# Patient Record
Sex: FEMALE | Race: BLACK OR AFRICAN AMERICAN | Employment: OTHER | URBAN - METROPOLITAN AREA
[De-identification: names, ages, dates, MRNs, and addresses within clinical notes are randomized per-mention and may not be internally consistent; named-entity substitution may affect disease eponyms.]

---

## 2019-04-19 ENCOUNTER — HOSPITAL ENCOUNTER (EMERGENCY)
Age: 54
Discharge: HOME | End: 2019-04-20
Attending: EMERGENCY MEDICINE
Payer: MEDICARE

## 2019-04-19 DIAGNOSIS — M54.50 MIDLINE LOW BACK PAIN WITHOUT SCIATICA, UNSPECIFIED CHRONICITY: Primary | ICD-10-CM

## 2019-04-19 LAB
ALBUMIN SERPL-MCNC: 3.2 G/DL (ref 3.5–5)
ALBUMIN/GLOB SERPL: 0.7 {RATIO} (ref 1.2–3.5)
ALP SERPL-CCNC: 135 U/L (ref 50–136)
ALT SERPL-CCNC: 24 U/L (ref 12–65)
ANION GAP SERPL CALC-SCNC: 7 MMOL/L (ref 7–16)
AST SERPL-CCNC: 12 U/L (ref 15–37)
BASOPHILS # BLD: 0.1 K/UL (ref 0–0.2)
BASOPHILS NFR BLD: 1 % (ref 0–2)
BILIRUB SERPL-MCNC: 0.9 MG/DL (ref 0.2–1.1)
BUN SERPL-MCNC: 27 MG/DL (ref 6–23)
CALCIUM SERPL-MCNC: 9.5 MG/DL (ref 8.3–10.4)
CHLORIDE SERPL-SCNC: 103 MMOL/L (ref 98–107)
CO2 SERPL-SCNC: 30 MMOL/L (ref 21–32)
CREAT SERPL-MCNC: 1.17 MG/DL (ref 0.6–1)
DIFFERENTIAL METHOD BLD: ABNORMAL
EOSINOPHIL # BLD: 0.3 K/UL (ref 0–0.8)
EOSINOPHIL NFR BLD: 2 % (ref 0.5–7.8)
ERYTHROCYTE [DISTWIDTH] IN BLOOD BY AUTOMATED COUNT: 15.6 % (ref 11.9–14.6)
GLOBULIN SER CALC-MCNC: 4.8 G/DL (ref 2.3–3.5)
GLUCOSE SERPL-MCNC: 259 MG/DL (ref 65–100)
HCT VFR BLD AUTO: 44 % (ref 35.8–46.3)
HGB BLD-MCNC: 13 G/DL (ref 11.7–15.4)
IMM GRANULOCYTES # BLD AUTO: 0.1 K/UL (ref 0–0.5)
IMM GRANULOCYTES NFR BLD AUTO: 0 % (ref 0–5)
LYMPHOCYTES # BLD: 2.8 K/UL (ref 0.5–4.6)
LYMPHOCYTES NFR BLD: 25 % (ref 13–44)
MCH RBC QN AUTO: 27.8 PG (ref 26.1–32.9)
MCHC RBC AUTO-ENTMCNC: 29.5 G/DL (ref 31.4–35)
MCV RBC AUTO: 94 FL (ref 79.6–97.8)
MONOCYTES # BLD: 0.6 K/UL (ref 0.1–1.3)
MONOCYTES NFR BLD: 5 % (ref 4–12)
NEUTS SEG # BLD: 7.4 K/UL (ref 1.7–8.2)
NEUTS SEG NFR BLD: 66 % (ref 43–78)
NRBC # BLD: 0 K/UL (ref 0–0.2)
PLATELET # BLD AUTO: 364 K/UL (ref 150–450)
PMV BLD AUTO: 10.9 FL (ref 9.4–12.3)
POTASSIUM SERPL-SCNC: 4 MMOL/L (ref 3.5–5.1)
PROT SERPL-MCNC: 8 G/DL (ref 6.3–8.2)
RBC # BLD AUTO: 4.68 M/UL (ref 4.05–5.2)
SODIUM SERPL-SCNC: 140 MMOL/L (ref 136–145)
WBC # BLD AUTO: 11.2 K/UL (ref 4.3–11.1)

## 2019-04-19 PROCEDURE — 80053 COMPREHEN METABOLIC PANEL: CPT

## 2019-04-19 PROCEDURE — 81003 URINALYSIS AUTO W/O SCOPE: CPT | Performed by: EMERGENCY MEDICINE

## 2019-04-19 PROCEDURE — 85025 COMPLETE CBC W/AUTO DIFF WBC: CPT

## 2019-04-19 PROCEDURE — 99284 EMERGENCY DEPT VISIT MOD MDM: CPT | Performed by: EMERGENCY MEDICINE

## 2019-04-20 VITALS
OXYGEN SATURATION: 95 % | HEIGHT: 69 IN | HEART RATE: 78 BPM | WEIGHT: 293 LBS | BODY MASS INDEX: 43.4 KG/M2 | TEMPERATURE: 97.8 F | SYSTOLIC BLOOD PRESSURE: 136 MMHG | RESPIRATION RATE: 16 BRPM | DIASTOLIC BLOOD PRESSURE: 61 MMHG

## 2019-04-20 PROCEDURE — 96374 THER/PROPH/DIAG INJ IV PUSH: CPT | Performed by: EMERGENCY MEDICINE

## 2019-04-20 PROCEDURE — 74011250636 HC RX REV CODE- 250/636: Performed by: EMERGENCY MEDICINE

## 2019-04-20 RX ORDER — KETOROLAC TROMETHAMINE 30 MG/ML
15 INJECTION, SOLUTION INTRAMUSCULAR; INTRAVENOUS
Status: COMPLETED | OUTPATIENT
Start: 2019-04-20 | End: 2019-04-20

## 2019-04-20 RX ADMIN — KETOROLAC TROMETHAMINE 15 MG: 30 INJECTION, SOLUTION INTRAMUSCULAR at 00:37

## 2019-04-20 RX ADMIN — SODIUM CHLORIDE 500 ML: 900 INJECTION, SOLUTION INTRAVENOUS at 00:37

## 2019-04-20 NOTE — ED NOTES
I have reviewed discharge instructions with the patient. The patient verbalized understanding. Patient left ED via Discharge Method: ambulatory to Home with family member. Opportunity for questions and clarification provided. Patient given 0 scripts. To continue your aftercare when you leave the hospital, you may receive an automated call from our care team to check in on how you are doing. This is a free service and part of our promise to provide the best care and service to meet your aftercare needs.  If you have questions, or wish to unsubscribe from this service please call [phone]. Thank you for Choosing our [facility] Emergency Department.

## 2019-04-20 NOTE — ED PROVIDER NOTES
History of recurring back pain. No urinary symptoms. No fevers or chills. She had lived in [place], [place] recently relocated here. States that she has narcotic prescriptions, but does not like to use them and defers on options of being given pain medicine in our department, even after requesting pain meds. No new injury. No fevers or chills. No redness or rash. No bowel or bladder function issues. No lower extremity weakness or numbness. Has pain meds but does not take them The history is provided by medical records. Back Pain This is a new problem. The problem has not changed since onset. The problem occurs daily. The pain is present in the lower back and generalized. The quality of the pain is described as aching. The pain does not radiate. Pertinent negatives include no fever, no numbness, no perianal numbness, no bladder incontinence, no paresthesias, no paresis and no weakness. She has tried nothing for the symptoms. Past Medical History:  
Diagnosis Date  Diabetes ([facility] [address] IDDM \  
 Hypertension  Ill-defined condition   
 chronic back problems  Thromboembolus ([facility] [address] DVT Past Surgical History:  
Procedure Laterality Date  HX GI    
 hernia repair  HX GYN    
 csection  HX HEENT    
 recent neck surg with bone repair No family history on file. Social History Socioeconomic History  Marital status:  Spouse name: Not on file  Number of children: Not on file  Years of education: Not on file  Highest education level: Not on file Occupational History  Not on file Social Needs  Financial resource strain: Not on file  Food insecurity:  
  Worry: Not on file Inability: Not on file  Transportation needs:  
  Medical: Not on file Non-medical: Not on file Tobacco Use  Smoking status: Not on file  Smokeless tobacco: Never Used Substance and Sexual Activity  Alcohol use: Never Frequency: Never  Drug use: Never  Sexual activity: Not on file Lifestyle  Physical activity:  
  Days per week: Not on file Minutes per session: Not on file  Stress: Not on file Relationships  Social connections:  
  Talks on phone: Not on file Gets together: Not on file Attends Yazidi service: Not on file Active member of club or organization: Not on file Attends meetings of clubs or organizations: Not on file Relationship status: Not on file  Intimate partner violence:  
  Fear of current or ex partner: Not on file Emotionally abused: Not on file Physically abused: Not on file Forced sexual activity: Not on file Other Topics Concern  Not on file Social History Narrative  Not on file ALLERGIES: Gabapentin; Lyrica [pregabalin]; Niacin; and Percocet [oxycodone-acetaminophen] Review of Systems Constitutional: Negative for chills and fever. Genitourinary: Negative for bladder incontinence. Musculoskeletal: Positive for back pain. Neurological: Negative for weakness, numbness and paresthesias. Psychiatric/Behavioral: Negative. All other systems reviewed and are negative. Vitals:  
 04/19/19 2257 04/19/19 2308 04/19/19 2331 BP: 171/88 (!) 199/99 166/83 Pulse: 85 85 80 Resp: 18 Temp: 97.9 °F (36.6 °C) SpO2: 96% 97% 95% Weight: (!) 167.8 kg (370 lb) Height: 5' 9\" (1.753 m) Physical Exam  
Constitutional: She appears well-developed and well-nourished. No distress. Very pleasant cooperative . Declines pain med offer HENT:  
Head: Atraumatic. Eyes: No scleral icterus. Neck: Neck supple. Cardiovascular: Normal rate. Pulmonary/Chest: Effort normal. No respiratory distress. Abdominal: Soft. She exhibits no distension. There is no tenderness. There is no guarding. Musculoskeletal: She exhibits tenderness. She exhibits no edema or deformity. Sore bilateral para-spinous lower back Neurological: No sensory deficit. She exhibits normal muscle tone. Skin: Skin is warm and dry. She is not diaphoretic. Psychiatric: Her behavior is normal. Thought content normal.  
Nursing note and vitals reviewed. MDM Number of Diagnoses or Management Options Midline low back pain without sciatica, unspecified chronicity:  
Diagnosis management comments: Some worsening on chronic lower back pain No acute deficit. Amount and/or Complexity of Data Reviewed Clinical lab tests: reviewed Decide to obtain previous medical records or to obtain history from someone other than the patient: yes Risk of Complications, Morbidity, and/or Mortality Presenting problems: moderate Diagnostic procedures: low Management options: low Patient Progress Patient progress: stable Procedures Urinalysis: Normal 
Recent Results (from the past 12 hour(s)) CBC WITH AUTOMATED DIFF Collection Time: 04/19/19 11:01 PM  
Result Value Ref Range WBC 11.2 (H) 4.3 - 11.1 K/uL  
 RBC 4.68 4.05 - 5.2 M/uL  
 HGB 13.0 11.7 - 15.4 g/dL HCT 44.0 35.8 - 46.3 % MCV 94.0 79.6 - 97.8 FL  
 MCH 27.8 26.1 - 32.9 PG  
 MCHC 29.5 (L) 31.4 - 35.0 g/dL  
 RDW 15.6 (H) 11.9 - 14.6 % PLATELET 313 234 - 541 K/uL MPV 10.9 9.4 - 12.3 FL ABSOLUTE NRBC 0.00 0.0 - 0.2 K/uL  
 DF AUTOMATED NEUTROPHILS 66 43 - 78 % LYMPHOCYTES 25 13 - 44 % MONOCYTES 5 4.0 - 12.0 % EOSINOPHILS 2 0.5 - 7.8 % BASOPHILS 1 0.0 - 2.0 % IMMATURE GRANULOCYTES 0 0.0 - 5.0 %  
 ABS. NEUTROPHILS 7.4 1.7 - 8.2 K/UL  
 ABS. LYMPHOCYTES 2.8 0.5 - 4.6 K/UL  
 ABS. MONOCYTES 0.6 0.1 - 1.3 K/UL  
 ABS. EOSINOPHILS 0.3 0.0 - 0.8 K/UL  
 ABS. BASOPHILS 0.1 0.0 - 0.2 K/UL  
 ABS. IMM. GRANS. 0.1 0.0 - 0.5 K/UL METABOLIC PANEL, COMPREHENSIVE Collection Time: 04/19/19 11:01 PM  
Result Value Ref Range Sodium 140 136 - 145 mmol/L  Potassium 4.0 3.5 - 5.1 mmol/L  
 Chloride 103 98 - 107 mmol/L  
 CO2 30 21 - 32 mmol/L Anion gap 7 7 - 16 mmol/L Glucose 259 (H) 65 - 100 mg/dL BUN 27 (H) 6 - 23 MG/DL Creatinine 1.17 (H) 0.6 - 1.0 MG/DL  
 GFR est AA >60 >60 ml/min/1.73m2 GFR est non-AA 51 (L) >60 ml/min/1.73m2 Calcium 9.5 8.3 - 10.4 MG/DL Bilirubin, total 0.9 0.2 - 1.1 MG/DL  
 ALT (SGPT) 24 12 - 65 U/L  
 AST (SGOT) 12 (L) 15 - 37 U/L Alk. phosphatase 135 50 - 136 U/L Protein, total 8.0 6.3 - 8.2 g/dL Albumin 3.2 (L) 3.5 - 5.0 g/dL Globulin 4.8 (H) 2.3 - 3.5 g/dL A-G Ratio 0.7 (L) 1.2 - 3.5

## 2019-04-20 NOTE — DISCHARGE INSTRUCTIONS
Patient Education     Use your pain and muscle relaxant medicines  Recheck urgently at any point with loss of function or sensation to lower extremity     Back Pain: Care Instructions  Your Care Instructions    Back pain has many possible causes. It is often related to problems with muscles and ligaments of the back. It may also be related to problems with the nerves, discs, or bones of the back. Moving, lifting, standing, sitting, or sleeping in an awkward way can strain the back. Sometimes you don't notice the injury until later. Arthritis is another common cause of back pain. Although it may hurt a lot, back pain usually improves on its own within several weeks. Most people recover in 12 weeks or less. Using good home treatment and being careful not to stress your back can help you feel better sooner. Follow-up care is a key part of your treatment and safety. Be sure to make and go to all appointments, and call your doctor if you are having problems. It's also a good idea to know your test results and keep a list of the medicines you take. How can you care for yourself at home? Sit or lie in positions that are most comfortable and reduce your pain. Try one of these positions when you lie down:  Lie on your back with your knees bent and supported by large pillows. Lie on the floor with your legs on the seat of a sofa or chair. Lie on your side with your knees and hips bent and a pillow between your legs. Lie on your stomach if it does not make pain worse. Do not sit up in bed, and avoid soft couches and twisted positions. Bed rest can help relieve pain at first, but it delays healing. Avoid bed rest after the first day of back pain. Change positions every 30 minutes. If you must sit for long periods of time, take breaks from sitting. Get up and walk around, or lie in a comfortable position. Try using a heating pad on a low or medium setting for 15 to 20 minutes every 2 or 3 hours.  Try a warm shower in place of one session with the heating pad. You can also try an ice pack for 10 to 15 minutes every 2 to 3 hours. Put a thin cloth between the ice pack and your skin. Take pain medicines exactly as directed. If the doctor gave you a prescription medicine for pain, take it as prescribed. If you are not taking a prescription pain medicine, ask your doctor if you can take an over-the-counter medicine. Take short walks several times a day. You can start with 5 to 10 minutes, 3 or 4 times a day, and work up to longer walks. Walk on level surfaces and avoid hills and stairs until your back is better. Return to work and other activities as soon as you can. Continued rest without activity is usually not good for your back. To prevent future back pain, do exercises to stretch and strengthen your back and stomach. Learn how to use good posture, safe lifting techniques, and proper body mechanics. When should you call for help? Call your doctor now or seek immediate medical care if:    You have new or worsening numbness in your legs. You have new or worsening weakness in your legs. (This could make it hard to stand up.)     You lose control of your bladder or bowels. Watch closely for changes in your health, and be sure to contact your doctor if:    You have a fever, lose weight, or don't feel well. You do not get better as expected. Where can you learn more? Go to http://nneka-francois.info/. Enter Y941 in the search box to learn more about \"Back Pain: Care Instructions. \"  Current as of: September 20, 2018  Content Version: 11.9  © 6515-3482 [employer]. Care instructions adapted under license by [employer] (which disclaims liability or warranty for this information).  If you have questions about a medical condition or this instruction, always ask your healthcare professional. [place] [address] any warranty or liability for your use of this information. Patient Education        Learning About Relief for Back Pain  What is back tension and strain? Back strain happens when you overstretch, or pull, a muscle in your back. You may hurt your back in an accident or when you exercise or lift something. Most back pain will get better with rest and time. You can take care of yourself at home to help your back heal.  What can you do first to relieve back pain? When you first feel back pain, try these steps:  Walk. Take a short walk (10 to 20 minutes) on a level surface (no slopes, hills, or stairs) every 2 to 3 hours. Walk only distances you can manage without pain, especially leg pain. Relax. Find a comfortable position for rest. Some people are comfortable on the floor or a medium-firm bed with a small pillow under their head and another under their knees. Some people prefer to lie on their side with a pillow between their knees. Don't stay in one position for too long. Try heat or ice. Try using a heating pad on a low or medium setting, or take a warm shower, for 15 to 20 minutes every 2 to 3 hours. Or you can buy single-use heat wraps that last up to 8 hours. You can also try an ice pack for 10 to 15 minutes every 2 to 3 hours. You can use an ice pack or a bag of frozen vegetables wrapped in a thin towel. There is not strong evidence that either heat or ice will help, but you can try them to see if they help. You may also want to try switching between heat and cold. Take pain medicine exactly as directed. If the doctor gave you a prescription medicine for pain, take it as prescribed. If you are not taking a prescription pain medicine, ask your doctor if you can take an over-the-counter medicine. What else can you do? Stretch and exercise. Exercises that increase flexibility may relieve your pain and make it easier for your muscles to keep your spine in a good, neutral position. And don't forget to keep walking. Do self-massage.  You can use self-massage to unwind after work or school or to energize yourself in the morning. You can easily massage your feet, hands, or neck. Self-massage works best if you are in comfortable clothes and are sitting or lying in a comfortable position. Use oil or lotion to massage bare skin. Reduce stress. Back pain can lead to a vicious [place]: Distress about the pain tenses the muscles in your back, which in turn causes more pain. Learn how to relax your mind and your muscles to lower your stress. Where can you learn more? Go to http://nneka-francois.info/. Enter K209 in the search box to learn more about \"Learning About Relief for Back Pain. \"  Current as of: March 21, 2017  Content Version: 11.5  © 8573-9810 [employer]wise, Incorporated. Care instructions adapted under license by [employer] (which disclaims liability or warranty for this information). If you have questions about a medical condition or this instruction, always ask your healthcare professional. [address] any warranty or liability for your use of this information.

## 2019-04-20 NOTE — ED NOTES
Patient to ED via POV. Patient with complaint of pain \"to my kidneys\" in low back, x 1 week. Patient denies any recent trauma. Patient denies any worsening of pain with movement. Patient does report dysuria.

## 2019-07-30 ENCOUNTER — APPOINTMENT (OUTPATIENT)
Dept: GENERAL RADIOLOGY | Age: 54
End: 2019-07-30
Attending: EMERGENCY MEDICINE
Payer: MEDICARE

## 2019-07-30 ENCOUNTER — HOSPITAL ENCOUNTER (EMERGENCY)
Age: 54
Discharge: HOME | End: 2019-07-30
Attending: EMERGENCY MEDICINE
Payer: MEDICARE

## 2019-07-30 ENCOUNTER — APPOINTMENT (OUTPATIENT)
Dept: CT IMAGING | Age: 54
End: 2019-07-30
Attending: EMERGENCY MEDICINE
Payer: MEDICARE

## 2019-07-30 VITALS
BODY MASS INDEX: 43.4 KG/M2 | DIASTOLIC BLOOD PRESSURE: 58 MMHG | TEMPERATURE: 98.5 F | RESPIRATION RATE: 16 BRPM | HEART RATE: 83 BPM | OXYGEN SATURATION: 91 % | SYSTOLIC BLOOD PRESSURE: 126 MMHG | HEIGHT: 69 IN | WEIGHT: 293 LBS

## 2019-07-30 DIAGNOSIS — R07.89 ATYPICAL CHEST PAIN: Primary | ICD-10-CM

## 2019-07-30 LAB
ALBUMIN SERPL-MCNC: 2.9 G/DL (ref 3.5–5)
ALBUMIN/GLOB SERPL: 0.6 {RATIO} (ref 1.2–3.5)
ALP SERPL-CCNC: 132 U/L (ref 50–136)
ALT SERPL-CCNC: 19 U/L (ref 12–65)
ANION GAP SERPL CALC-SCNC: 9 MMOL/L (ref 7–16)
AST SERPL-CCNC: 9 U/L (ref 15–37)
ATRIAL RATE: 89 BPM
BASOPHILS # BLD: 0.1 K/UL (ref 0–0.2)
BASOPHILS NFR BLD: 1 % (ref 0–2)
BILIRUB SERPL-MCNC: 1 MG/DL (ref 0.2–1.1)
BNP SERPL-MCNC: 23 PG/ML
BUN SERPL-MCNC: 25 MG/DL (ref 6–23)
CALCIUM SERPL-MCNC: 8.8 MG/DL (ref 8.3–10.4)
CALCULATED P AXIS, ECG09: 8 DEGREES
CALCULATED R AXIS, ECG10: 26 DEGREES
CALCULATED T AXIS, ECG11: 10 DEGREES
CHLORIDE SERPL-SCNC: 102 MMOL/L (ref 98–107)
CO2 SERPL-SCNC: 27 MMOL/L (ref 21–32)
CREAT SERPL-MCNC: 1.03 MG/DL (ref 0.6–1)
D DIMER PPP FEU-MCNC: 1.12 UG/ML(FEU)
DIAGNOSIS, 93000: NORMAL
DIFFERENTIAL METHOD BLD: ABNORMAL
EOSINOPHIL # BLD: 0.1 K/UL (ref 0–0.8)
EOSINOPHIL NFR BLD: 1 % (ref 0.5–7.8)
ERYTHROCYTE [DISTWIDTH] IN BLOOD BY AUTOMATED COUNT: 14.4 % (ref 11.9–14.6)
GLOBULIN SER CALC-MCNC: 4.6 G/DL (ref 2.3–3.5)
GLUCOSE SERPL-MCNC: 205 MG/DL (ref 65–100)
HCT VFR BLD AUTO: 42 % (ref 35.8–46.3)
HGB BLD-MCNC: 13 G/DL (ref 11.7–15.4)
IMM GRANULOCYTES # BLD AUTO: 0.1 K/UL (ref 0–0.5)
IMM GRANULOCYTES NFR BLD AUTO: 0 % (ref 0–5)
LYMPHOCYTES # BLD: 2.6 K/UL (ref 0.5–4.6)
LYMPHOCYTES NFR BLD: 22 % (ref 13–44)
MCH RBC QN AUTO: 29 PG (ref 26.1–32.9)
MCHC RBC AUTO-ENTMCNC: 31 G/DL (ref 31.4–35)
MCV RBC AUTO: 93.5 FL (ref 79.6–97.8)
MONOCYTES # BLD: 0.5 K/UL (ref 0.1–1.3)
MONOCYTES NFR BLD: 4 % (ref 4–12)
NEUTS SEG # BLD: 8.4 K/UL (ref 1.7–8.2)
NEUTS SEG NFR BLD: 72 % (ref 43–78)
NRBC # BLD: 0 K/UL (ref 0–0.2)
P-R INTERVAL, ECG05: 150 MS
PLATELET # BLD AUTO: 390 K/UL (ref 150–450)
PMV BLD AUTO: 10.1 FL (ref 9.4–12.3)
POTASSIUM SERPL-SCNC: 3.8 MMOL/L (ref 3.5–5.1)
PROT SERPL-MCNC: 7.5 G/DL (ref 6.3–8.2)
Q-T INTERVAL, ECG07: 378 MS
QRS DURATION, ECG06: 98 MS
QTC CALCULATION (BEZET), ECG08: 459 MS
RBC # BLD AUTO: 4.49 M/UL (ref 4.05–5.2)
SODIUM SERPL-SCNC: 138 MMOL/L (ref 136–145)
TROPONIN I SERPL-MCNC: <0.02 NG/ML (ref 0.02–0.05)
TROPONIN I SERPL-MCNC: <0.02 NG/ML (ref 0.02–0.05)
VENTRICULAR RATE, ECG03: 89 BPM
WBC # BLD AUTO: 11.7 K/UL (ref 4.3–11.1)

## 2019-07-30 PROCEDURE — 99284 EMERGENCY DEPT VISIT MOD MDM: CPT | Performed by: EMERGENCY MEDICINE

## 2019-07-30 PROCEDURE — 93005 ELECTROCARDIOGRAM TRACING: CPT | Performed by: EMERGENCY MEDICINE

## 2019-07-30 PROCEDURE — 84484 ASSAY OF TROPONIN QUANT: CPT

## 2019-07-30 PROCEDURE — 96375 TX/PRO/DX INJ NEW DRUG ADDON: CPT | Performed by: EMERGENCY MEDICINE

## 2019-07-30 PROCEDURE — 85025 COMPLETE CBC W/AUTO DIFF WBC: CPT

## 2019-07-30 PROCEDURE — 74011000258 HC RX REV CODE- 258: Performed by: EMERGENCY MEDICINE

## 2019-07-30 PROCEDURE — 74011250636 HC RX REV CODE- 250/636: Performed by: EMERGENCY MEDICINE

## 2019-07-30 PROCEDURE — 71046 X-RAY EXAM CHEST 2 VIEWS: CPT

## 2019-07-30 PROCEDURE — 96361 HYDRATE IV INFUSION ADD-ON: CPT | Performed by: EMERGENCY MEDICINE

## 2019-07-30 PROCEDURE — 85379 FIBRIN DEGRADATION QUANT: CPT

## 2019-07-30 PROCEDURE — 74011636320 HC RX REV CODE- 636/320: Performed by: EMERGENCY MEDICINE

## 2019-07-30 PROCEDURE — 96374 THER/PROPH/DIAG INJ IV PUSH: CPT | Performed by: EMERGENCY MEDICINE

## 2019-07-30 PROCEDURE — 80053 COMPREHEN METABOLIC PANEL: CPT

## 2019-07-30 PROCEDURE — 71260 CT THORAX DX C+: CPT

## 2019-07-30 PROCEDURE — 73030 X-RAY EXAM OF SHOULDER: CPT

## 2019-07-30 PROCEDURE — 83880 ASSAY OF NATRIURETIC PEPTIDE: CPT

## 2019-07-30 RX ORDER — DEXAMETHASONE SODIUM PHOSPHATE 100 MG/10ML
10 INJECTION INTRAMUSCULAR; INTRAVENOUS ONCE
Status: COMPLETED | OUTPATIENT
Start: 2019-07-30 | End: 2019-07-30

## 2019-07-30 RX ORDER — CYCLOBENZAPRINE HCL 10 MG
10 TABLET ORAL
Qty: 30 TAB | Refills: 0 | Status: SHIPPED | OUTPATIENT
Start: 2019-07-30 | End: 2020-05-22

## 2019-07-30 RX ORDER — SODIUM CHLORIDE 9 MG/ML
150 INJECTION, SOLUTION INTRAVENOUS ONCE
Status: COMPLETED | OUTPATIENT
Start: 2019-07-30 | End: 2019-07-30

## 2019-07-30 RX ORDER — KETOROLAC TROMETHAMINE 30 MG/ML
30 INJECTION, SOLUTION INTRAMUSCULAR; INTRAVENOUS
Status: COMPLETED | OUTPATIENT
Start: 2019-07-30 | End: 2019-07-30

## 2019-07-30 RX ORDER — SODIUM CHLORIDE 0.9 % (FLUSH) 0.9 %
10 SYRINGE (ML) INJECTION
Status: COMPLETED | OUTPATIENT
Start: 2019-07-30 | End: 2019-07-30

## 2019-07-30 RX ORDER — TRAMADOL HYDROCHLORIDE 50 MG/1
50-100 TABLET ORAL
Qty: 20 TAB | Refills: 0 | Status: SHIPPED | OUTPATIENT
Start: 2019-07-30 | End: 2019-08-02

## 2019-07-30 RX ORDER — ONDANSETRON 2 MG/ML
4 INJECTION INTRAMUSCULAR; INTRAVENOUS
Status: COMPLETED | OUTPATIENT
Start: 2019-07-30 | End: 2019-07-30

## 2019-07-30 RX ORDER — HYDROMORPHONE HYDROCHLORIDE 1 MG/ML
1 INJECTION, SOLUTION INTRAMUSCULAR; INTRAVENOUS; SUBCUTANEOUS
Status: COMPLETED | OUTPATIENT
Start: 2019-07-30 | End: 2019-07-30

## 2019-07-30 RX ORDER — NAPROXEN SODIUM 550 MG/1
550 TABLET ORAL 2 TIMES DAILY WITH MEALS
Qty: 20 TAB | Refills: 0 | Status: SHIPPED | OUTPATIENT
Start: 2019-07-30 | End: 2019-11-01

## 2019-07-30 RX ADMIN — SODIUM CHLORIDE 150 ML/HR: 900 INJECTION, SOLUTION INTRAVENOUS at 17:22

## 2019-07-30 RX ADMIN — HYDROMORPHONE HYDROCHLORIDE 1 MG: 1 INJECTION, SOLUTION INTRAMUSCULAR; INTRAVENOUS; SUBCUTANEOUS at 19:46

## 2019-07-30 RX ADMIN — IOPAMIDOL 100 ML: 755 INJECTION, SOLUTION INTRAVENOUS at 19:03

## 2019-07-30 RX ADMIN — DEXAMETHASONE SODIUM PHOSPHATE 10 MG: 10 INJECTION INTRAMUSCULAR; INTRAVENOUS at 19:45

## 2019-07-30 RX ADMIN — Medication 10 ML: at 19:03

## 2019-07-30 RX ADMIN — ONDANSETRON 4 MG: 2 INJECTION INTRAMUSCULAR; INTRAVENOUS at 19:44

## 2019-07-30 RX ADMIN — SODIUM CHLORIDE 100 ML: 900 INJECTION, SOLUTION INTRAVENOUS at 19:03

## 2019-07-30 RX ADMIN — KETOROLAC TROMETHAMINE 30 MG: 30 INJECTION, SOLUTION INTRAMUSCULAR at 19:47

## 2019-07-30 NOTE — ED PROVIDER NOTES
The history is provided by the patient. Chest Pain (Angina)    This is a new problem. The current episode started more than 1 week ago. The problem has not changed since onset. The problem occurs constantly. The pain is associated with normal activity. The pain is present in the substernal region and left side. The pain is moderate. The quality of the pain is described as pressure-like. Radiates to: left arm. The symptoms are aggravated by movement. Associated symptoms include dizziness (describes intermittent spinning sensation for several weeks), lower extremity edema and shortness of breath (dyspnea on exertion). Pertinent negatives include no abdominal pain, no back pain, no cough, no diaphoresis, no fever, no hemoptysis, no leg pain, no nausea, no numbness, no vomiting and no weakness. She has tried nothing for the symptoms. Her past medical history does not include DVT or PE. Pertinent negatives include no cardiac catheterization, no cardiac stents and no CABG. Past Medical History:   Diagnosis Date    Diabetes ([facility]     IDDM \    Hypertension     Ill-defined condition     chronic back problems    Thromboembolus ([facility]     DVT       Past Surgical History:   Procedure Laterality Date    HX GI      hernia repair    HX GYN      csection    HX HEENT      recent neck surg with bone repair         History reviewed. No pertinent family history.     Social History     Socioeconomic History    Marital status:      Spouse name: Not on file    Number of children: Not on file    Years of education: Not on file    Highest education level: Not on file   Occupational History    Not on file   Social Needs    Financial resource strain: Not on file    Food insecurity:     Worry: Not on file     Inability: Not on file    Transportation needs:     Medical: Not on file     Non-medical: Not on file   Tobacco Use    Smoking status: Not on file    Smokeless tobacco: Never Used   Substance and Sexual Activity    Alcohol use: Never     Frequency: Never    Drug use: Never    Sexual activity: Not on file   Lifestyle    Physical activity:     Days per week: Not on file     Minutes per session: Not on file    Stress: Not on file   Relationships    Social connections:     Talks on phone: Not on file     Gets together: Not on file     Attends [employer] service: Not on file     Active member of club or organization: Not on file     Attends meetings of clubs or organizations: Not on file     Relationship status: Not on file    Intimate partner violence:     Fear of current or ex partner: Not on file     Emotionally abused: Not on file     Physically abused: Not on file     Forced sexual activity: Not on file   Other Topics Concern    Not on file   Social History Narrative    Not on file         ALLERGIES: Gabapentin; Lyrica [pregabalin]; Niacin; and Percocet [oxycodone-acetaminophen]    Review of Systems   Constitutional: Negative for diaphoresis and fever. HENT: Negative for congestion, rhinorrhea and tinnitus. Respiratory: Positive for shortness of breath (dyspnea on exertion). Negative for cough and hemoptysis. Cardiovascular: Positive for chest pain. Gastrointestinal: Negative for abdominal pain, nausea and vomiting. Endocrine: Negative for polyuria. Genitourinary: Negative for dysuria and frequency. Musculoskeletal: Negative for back pain. Neurological: Positive for dizziness (describes intermittent spinning sensation for several weeks). Negative for weakness and numbness. Vitals:    07/30/19 1318   BP: 123/77   Pulse: 83   Resp: 16   Temp: 98.5 °F (36.9 °C)   SpO2: 94%   Weight: (!) 180.1 kg (397 lb)   Height: 5' 9\" (1.753 m)            Physical Exam   Constitutional: She is oriented to person, place, and time. She appears well-developed and well-nourished. No distress. HENT:   Head: Normocephalic. Eyes: Pupils are equal, round, and reactive to light.    Cardiovascular: Normal rate, regular rhythm and normal heart sounds. Pulmonary/Chest: Effort normal and breath sounds normal.   Abdominal: Soft. She exhibits no distension and no mass. There is no tenderness. There is no rebound and no guarding. Musculoskeletal:        Left shoulder: She exhibits decreased range of motion, tenderness, bony tenderness and pain. She exhibits no deformity, normal pulse and normal strength. Lymphadenopathy:     She has no cervical adenopathy. Neurological: She is alert and oriented to person, place, and time. She has normal strength. She is not disoriented. No cranial nerve deficit or sensory deficit. Coordination normal. GCS eye subscore is 4. GCS verbal subscore is 5. GCS motor subscore is 6. Skin: Skin is warm and dry. Nursing note and vitals reviewed. MDM  Number of Diagnoses or Management Options  Atypical chest pain:   Diagnosis management comments: Musculoskeletal left shoulder pain. Chest pain may be related. EKG nonischemic which shows LVH. Initial troponin negative. No tachycardia or tachypnea but oxygen saturations 94%. D-dimer to exclude PE though no pleuritic component. Rule out PE with d-dimer and MI. Refer to Levine, Susan. \[facility].\"" cardiology for outpatient stress testing. She states she had a cardiologist in [place] that was \"going to do a heart catheterization 1-2 years ago but he retired and she has not followed up. Her PCP at [facility] family medicine has referred her to cardiology but that appointment is not until September. Patient states she would prefer to have care in the [facility]. I see no evidence on physical exam of stroke or cerebellar stroke. No current dizziness. X-ray left shoulder due to musculoskeletal aspect of the pain and pain with movement and palpation. 5:55 PM  No MI. Follow-up with the [facility] cardiology for further outpatient evaluation. D-dimer was elevated and CT scan is pending.   Care transferred to Dr. Alessandra Cockayne at 6 PM.       Amount and/or Complexity of Data Reviewed  Clinical lab tests: ordered and reviewed (Results for orders placed or performed during the hospital encounter of 07/30/19  -CBC WITH AUTOMATED DIFF       Result                      Value             Ref Range           WBC                         11.7 (H)          4.3 - 11.1 K*       RBC                         4.49              4.05 - 5.2 M*       HGB                         13.0              11.7 - 15.4 *       HCT                         42.0              35.8 - 46.3 %       MCV                         93.5              79.6 - 97.8 *       MCH                         29.0              26.1 - 32.9 *       MCHC                        31.0 (L)          31.4 - 35.0 *       RDW                         14.4              11.9 - 14.6 %       PLATELET                    390               150 - 450 K/*       MPV                         10.1              9.4 - 12.3 FL       ABSOLUTE NRBC               0.00              0.0 - 0.2 K/*       DF                          AUTOMATED                             NEUTROPHILS                 72                43 - 78 %           LYMPHOCYTES                 22                13 - 44 %           MONOCYTES                   4                 4.0 - 12.0 %        EOSINOPHILS                 1                 0.5 - 7.8 %         BASOPHILS                   1                 0.0 - 2.0 %         IMMATURE GRANULOCYTES       0                 0.0 - 5.0 %         ABS. NEUTROPHILS            8.4 (H)           1.7 - 8.2 K/*       ABS. LYMPHOCYTES            2.6               0.5 - 4.6 K/*       ABS. MONOCYTES              0.5               0.1 - 1.3 K/*       ABS. EOSINOPHILS            0.1               0.0 - 0.8 K/*       ABS. BASOPHILS              0.1               0.0 - 0.2 K/*       ABS. IMM.  GRANS.            0.1               0.0 - 0.5 K/*  -METABOLIC PANEL, COMPREHENSIVE       Result                      Value             Ref Range           Sodium 138               136 - 145 mm*       Potassium                   3.8               3.5 - 5.1 mm*       Chloride                    102               98 - 107 mmo*       CO2                         27                21 - 32 mmol*       Anion gap                   9                 7 - 16 mmol/L       Glucose                     205 (H)           65 - 100 mg/*       BUN                         25 (H)            6 - 23 MG/DL        Creatinine                  1.03 (H)          0.6 - 1.0 MG*       GFR est AA                  >60               >60 ml/min/1*       GFR est non-AA              59 (L)            >60 ml/min/1*       Calcium                     8.8               8.3 - 10.4 M*       Bilirubin, total            1.0               0.2 - 1.1 MG*       ALT (SGPT)                  19                12 - 65 U/L         AST (SGOT)                  9 (L)             15 - 37 U/L         Alk.  phosphatase            132               50 - 136 U/L        Protein, total              7.5               6.3 - 8.2 g/*       Albumin                     2.9 (L)           3.5 - 5.0 g/*       Globulin                    4.6 (H)           2.3 - 3.5 g/*       A-G Ratio                   0.6 (L)           1.2 - 3.5      -TROPONIN I       Result                      Value             Ref Range           Troponin-I, Qt.             <0.02 (L)         0.02 - 0.05 *  -BNP       Result                      Value             Ref Range           BNP                         23 (H)            0 pg/mL        -TROPONIN I       Result                      Value             Ref Range           Troponin-I, Qt.             <0.02 (L)         0.02 - 0.05 *  -D DIMER       Result                      Value             Ref Range           D DIMER                     1.12 (HH)         <0.56 ug/ml(*  -EKG, 12 LEAD, INITIAL       Result                      Value             Ref Range           Ventricular Rate            89 BPM                 Atrial Rate                 89                BPM                 P-R Interval                150               ms                  QRS Duration                98                ms                  Q-T Interval                378               ms                  QTC Calculation (Bezet)     459               ms                  Calculated P Axis           8                 degrees             Calculated R Axis           26                degrees             Calculated T Axis           10                degrees             Diagnosis                                                     Normal sinus rhythm   Minimal voltage criteria for LVH, may be normal variant   Borderline ECG   No previous ECGs available   Confirmed by Elias Ladd MD (), Jo Anncorinna Lisbet ([id number]) on 7/30/2019 2:04:17 PM     )  Tests in the radiology section of CPT®: ordered and reviewed (Xr Chest Pa Lat    Result Date: 7/30/2019  EXAM: XR CHEST PA LAT INDICATION: chest pain COMPARISON: None. FINDINGS: PA and lateral radiographs of the chest demonstrate clear lungs. The cardiac and mediastinal contours and pulmonary vascularity are normal. The bones and soft tissues are within normal limits. IMPRESSION: Normal chest.    Xr Shoulder Lt Ap/lat Min 2 V    Result Date: 7/30/2019  Left shoulder series HISTORY: Pain. No stated injury. Comparison: None FINDINGS: 3 views were obtained. There is no evidence of acute fracture or dislocation. There are mild degenerative changes of the acromioclavicular joint. . There is no bony destruction.      IMPRESSION: Unremarkable left shoulder radiographs.    )    Risk of Complications, Morbidity, and/or Mortality  General comments: 8:05 PM  CT negative for PE  Patient tearful with pain  We'll medicate here, to include one dose of Decadron for long-acting steroid  Home with conservative prescriptions for musculoskeletal chest  Pain  Referral called in to [facility] cardiology           Procedures

## 2019-07-30 NOTE — ED TRIAGE NOTES
Pt arrives via POV from home, pt c/o chest pain, shortness of breath, nausea, dizziness for 1 week. Radiation to left arm, diaphoresis, pressure at chest and pressure in bilateral ears. Pt states bilateral hand tingling, denies numbness. Pt denies MI, states has an irregular heart rhythm but unsure of which ones, takes Xarelto 20mg for hx blood clots.

## 2019-07-30 NOTE — ED NOTES
Report to [facility]. Yoel LAINEZ to assume care of this pt at this time.

## 2019-07-31 NOTE — DISCHARGE INSTRUCTIONS
Patient Education        Chest Pain: Care Instructions  Your Care Instructions    There are many things that can cause chest pain. Some are not serious and will get better on their own in a few days. But some kinds of chest pain need more testing and treatment. Your doctor may have recommended a follow-up visit in the next 8 to 12 hours. If you are not getting better, you may need more tests or treatment. Even though your doctor has released you, you still need to watch for any problems. The doctor carefully checked you, but sometimes problems can develop later. If you have new symptoms or if your symptoms do not get better, get medical care right away. If you have worse or different chest pain or pressure that lasts more than 5 minutes or you passed out (lost consciousness), call 911 or seek other emergency help right away. A medical visit is only one step in your treatment. Even if you feel better, you still need to do what your doctor recommends, such as going to all suggested follow-up appointments and taking medicines exactly as directed. This will help you recover and help prevent future problems. How can you care for yourself at home? · Rest until you feel better. · Take your medicine exactly as prescribed. Call your doctor if you think you are having a problem with your medicine. · Do not drive after taking a prescription pain medicine. When should you call for help? Call 911 if:    · You passed out (lost consciousness).     · You have severe difficulty breathing.     · You have symptoms of a heart attack. These may include:  ? Chest pain or pressure, or a strange feeling in your chest.  ? Sweating. ? Shortness of breath. ? Nausea or vomiting. ? Pain, pressure, or a strange feeling in your back, neck, jaw, or upper belly or in one or both shoulders or arms. ? Lightheadedness or sudden weakness. ? A fast or irregular heartbeat.   After you call 911, the  may tell you to chew 1 adult-strength or 2 to 4 low-dose aspirin. Wait for an ambulance. Do not try to drive yourself.    Call your doctor today if:    · You have any trouble breathing.     · Your chest pain gets worse.     · You are dizzy or lightheaded, or you feel like you may faint.     · You are not getting better as expected.     · You are having new or different chest pain. Where can you learn more? Go to http://nneka-francois.info/. Enter A120 in the search box to learn more about \"Chest Pain: Care Instructions. \"  Current as of: September 23, 2018  Content Version: 12.1  © 8166-3879 [employer]. Care instructions adapted under license by [employer] (which disclaims liability or warranty for this information). If you have questions about a medical condition or this instruction, always ask your healthcare professional. [place] [address] any warranty or liability for your use of this information. Patient Education        Musculoskeletal Chest Pain: Care Instructions  Your Care Instructions    Chest pain is not always a sign that something is wrong with your heart or that you have another serious problem. The doctor thinks your chest pain is caused by strained muscles or ligaments, inflamed chest cartilage, or another problem in your chest, rather than by your heart. You may need more tests to find the cause of your chest pain. Follow-up care is a key part of your treatment and safety. Be sure to make and go to all appointments, and call your doctor if you are having problems. It's also a good idea to know your test results and keep a list of the medicines you take. How can you care for yourself at home? · Take pain medicines exactly as directed. ? If the doctor gave you a prescription medicine for pain, take it as prescribed. ? If you are not taking a prescription pain medicine, ask your doctor if you can take an over-the-counter medicine.   · Rest and protect the sore area. · Stop, change, or take a break from any activity that may be causing your pain or soreness. · Put ice or a cold pack on the sore area for 10 to 20 minutes at a time. Try to do this every 1 to 2 hours for the next 3 days (when you are awake) or until the swelling goes down. Put a thin cloth between the ice and your skin. · After 2 or 3 days, apply a heating pad set on low or a warm cloth to the area that hurts. Some doctors suggest that you go back and forth between hot and cold. · Do not wrap or tape your ribs for support. This may cause you to take smaller breaths, which could increase your risk of lung problems. · Mentholated creams such as Bengay or Icy Hot may soothe sore muscles. Follow the instructions on the package. · Follow your doctor's instructions for exercising. · Gentle stretching and massage may help you get better faster. Stretch slowly to the point just before pain begins, and hold the stretch for at least 15 to 30 seconds. Do this 3 or 4 times a day. Stretch just after you have applied heat. · As your pain gets better, slowly return to your normal activities. Any increased pain may be a sign that you need to rest a while longer. When should you call for help? Call 911 anytime you think you may need emergency care. For example, call if:    · You have chest pain or pressure. This may occur with:  ? Sweating. ? Shortness of breath. ? Nausea or vomiting. ? Pain that spreads from the chest to the neck, jaw, or one or both shoulders or arms. ? Dizziness or lightheadedness. ? A fast or uneven pulse. After calling 911, chew 1 adult-strength aspirin. Wait for an ambulance.  Do not try to drive yourself.     · You have sudden chest pain and shortness of breath, or you cough up blood.    Call your doctor now or seek immediate medical care if:    · You have any trouble breathing.     · Your chest pain gets worse.     · Your chest pain occurs consistently with exercise and is relieved by rest.    Watch closely for changes in your health, and be sure to contact your doctor if:    · Your chest pain does not get better after 1 week. Where can you learn more? Go to http://nneka-francois.info/. Enter V293 in the search box to learn more about \"Musculoskeletal Chest Pain: Care Instructions. \"  Current as of: September 23, 2018  Content Version: 12.1  © 5024-4313 [employer]. Care instructions adapted under license by [employer] (which disclaims liability or warranty for this information). If you have questions about a medical condition or this instruction, always ask your healthcare professional. [address] any warranty or liability for your use of this information.

## 2019-07-31 NOTE — ED NOTES
I have reviewed discharge instructions with the patient. The patient verbalized understanding. Patient left ED via Discharge Method: wheelchair to Home with family    Opportunity for questions and clarification provided. Patient given 3 scripts. To continue your aftercare when you leave the hospital, you may receive an automated call from our care team to check in on how you are doing. This is a free service and part of our promise to provide the best care and service to meet your aftercare needs.  If you have questions, or wish to unsubscribe from this service please call [phone]. Thank you for Choosing our [facility] Emergency Department.

## 2019-08-06 PROBLEM — E11.9 DIABETES MELLITUS (HCC): Status: ACTIVE | Noted: 2019-07-29

## 2019-08-06 PROBLEM — I82.509 CHRONIC DEEP VEIN THROMBOSIS (DVT) OF LOWER EXTREMITY (HCC): Status: ACTIVE | Noted: 2019-07-29

## 2019-08-06 PROBLEM — R60.9 EDEMA: Status: ACTIVE | Noted: 2017-10-20

## 2019-08-08 ENCOUNTER — HOSPITAL ENCOUNTER (OUTPATIENT)
Dept: LAB | Age: 54
Discharge: HOME | End: 2019-08-08
Payer: MEDICARE

## 2019-08-08 DIAGNOSIS — R07.89 OTHER CHEST PAIN: ICD-10-CM

## 2019-08-08 DIAGNOSIS — R06.09 DYSPNEA ON EXERTION: ICD-10-CM

## 2019-08-08 LAB
ANION GAP SERPL CALC-SCNC: 7 MMOL/L (ref 7–16)
BASOPHILS # BLD: 0.1 K/UL (ref 0–0.2)
BASOPHILS NFR BLD: 1 % (ref 0–2)
BNP SERPL-MCNC: 25 PG/ML
BUN SERPL-MCNC: 17 MG/DL (ref 6–23)
CALCIUM SERPL-MCNC: 9.3 MG/DL (ref 8.3–10.4)
CHLORIDE SERPL-SCNC: 100 MMOL/L (ref 98–107)
CO2 SERPL-SCNC: 32 MMOL/L (ref 21–32)
CREAT SERPL-MCNC: 1 MG/DL (ref 0.6–1)
DIFFERENTIAL METHOD BLD: ABNORMAL
EOSINOPHIL # BLD: 0.2 K/UL (ref 0–0.8)
EOSINOPHIL NFR BLD: 1 % (ref 0.5–7.8)
ERYTHROCYTE [DISTWIDTH] IN BLOOD BY AUTOMATED COUNT: 14.5 % (ref 11.9–14.6)
GLUCOSE SERPL-MCNC: 187 MG/DL (ref 65–100)
HCT VFR BLD AUTO: 39.9 % (ref 35.8–46.3)
HGB BLD-MCNC: 12.4 G/DL (ref 11.7–15.4)
IMM GRANULOCYTES # BLD AUTO: 0 K/UL (ref 0–0.5)
IMM GRANULOCYTES NFR BLD AUTO: 0 % (ref 0–5)
INR PPP: 1.3
LYMPHOCYTES # BLD: 2.6 K/UL (ref 0.5–4.6)
LYMPHOCYTES NFR BLD: 21 % (ref 13–44)
MCH RBC QN AUTO: 28.9 PG (ref 26.1–32.9)
MCHC RBC AUTO-ENTMCNC: 31.1 G/DL (ref 31.4–35)
MCV RBC AUTO: 93 FL (ref 79.6–97.8)
MONOCYTES # BLD: 0.6 K/UL (ref 0.1–1.3)
MONOCYTES NFR BLD: 4 % (ref 4–12)
NEUTS SEG # BLD: 9 K/UL (ref 1.7–8.2)
NEUTS SEG NFR BLD: 73 % (ref 43–78)
NRBC # BLD: 0 K/UL (ref 0–0.2)
PLATELET # BLD AUTO: 335 K/UL (ref 150–450)
PMV BLD AUTO: 10.2 FL (ref 9.4–12.3)
POTASSIUM SERPL-SCNC: 3.6 MMOL/L (ref 3.5–5.1)
PROTHROMBIN TIME: 16.5 SEC (ref 11.7–14.5)
RBC # BLD AUTO: 4.29 M/UL (ref 4.05–5.2)
SODIUM SERPL-SCNC: 139 MMOL/L (ref 136–145)
WBC # BLD AUTO: 12.4 K/UL (ref 4.3–11.1)

## 2019-08-08 PROCEDURE — 85610 PROTHROMBIN TIME: CPT

## 2019-08-08 PROCEDURE — 83880 ASSAY OF NATRIURETIC PEPTIDE: CPT

## 2019-08-08 PROCEDURE — 36415 COLL VENOUS BLD VENIPUNCTURE: CPT

## 2019-08-08 PROCEDURE — 85025 COMPLETE CBC W/AUTO DIFF WBC: CPT

## 2019-08-08 PROCEDURE — 80048 BASIC METABOLIC PNL TOTAL CA: CPT

## 2019-08-12 ENCOUNTER — HOSPITAL ENCOUNTER (OUTPATIENT)
Dept: CARDIAC CATH/INVASIVE PROCEDURES | Age: 54
Discharge: HOME | End: 2019-08-12
Attending: INTERNAL MEDICINE | Admitting: INTERNAL MEDICINE
Payer: MEDICARE

## 2019-08-12 VITALS
OXYGEN SATURATION: 97 % | SYSTOLIC BLOOD PRESSURE: 161 MMHG | HEART RATE: 83 BPM | DIASTOLIC BLOOD PRESSURE: 76 MMHG | RESPIRATION RATE: 18 BRPM | WEIGHT: 293 LBS | TEMPERATURE: 98.1 F | BODY MASS INDEX: 43.4 KG/M2 | HEIGHT: 69 IN

## 2019-08-12 LAB
ATRIAL RATE: 79 BPM
CALCULATED P AXIS, ECG09: 11 DEGREES
CALCULATED R AXIS, ECG10: 29 DEGREES
CALCULATED T AXIS, ECG11: 16 DEGREES
DIAGNOSIS, 93000: NORMAL
GLUCOSE BLD STRIP.AUTO-MCNC: 172 MG/DL (ref 65–100)
P-R INTERVAL, ECG05: 150 MS
Q-T INTERVAL, ECG07: 390 MS
QRS DURATION, ECG06: 100 MS
QTC CALCULATION (BEZET), ECG08: 447 MS
VENTRICULAR RATE, ECG03: 79 BPM

## 2019-08-12 PROCEDURE — 77030004534 HC CATH ANGI DX INFN CARD -A

## 2019-08-12 PROCEDURE — 93458 L HRT ARTERY/VENTRICLE ANGIO: CPT

## 2019-08-12 PROCEDURE — 77030029997 HC DEV COM RDL R BND TELE -B

## 2019-08-12 PROCEDURE — 82962 GLUCOSE BLOOD TEST: CPT

## 2019-08-12 PROCEDURE — 74011250637 HC RX REV CODE- 250/637: Performed by: INTERNAL MEDICINE

## 2019-08-12 PROCEDURE — 74011250636 HC RX REV CODE- 250/636

## 2019-08-12 PROCEDURE — 93005 ELECTROCARDIOGRAM TRACING: CPT | Performed by: INTERNAL MEDICINE

## 2019-08-12 PROCEDURE — 74011000250 HC RX REV CODE- 250: Performed by: INTERNAL MEDICINE

## 2019-08-12 PROCEDURE — 77030015766

## 2019-08-12 PROCEDURE — C1894 INTRO/SHEATH, NON-LASER: HCPCS

## 2019-08-12 PROCEDURE — 74011636320 HC RX REV CODE- 636/320: Performed by: INTERNAL MEDICINE

## 2019-08-12 PROCEDURE — 99153 MOD SED SAME PHYS/QHP EA: CPT

## 2019-08-12 PROCEDURE — 74011250636 HC RX REV CODE- 250/636: Performed by: INTERNAL MEDICINE

## 2019-08-12 PROCEDURE — 99152 MOD SED SAME PHYS/QHP 5/>YRS: CPT

## 2019-08-12 RX ORDER — DIAZEPAM 5 MG/1
5 TABLET ORAL ONCE
Status: DISCONTINUED | OUTPATIENT
Start: 2019-08-12 | End: 2019-08-12 | Stop reason: HOSPADM

## 2019-08-12 RX ORDER — SODIUM CHLORIDE 9 MG/ML
75 INJECTION, SOLUTION INTRAVENOUS CONTINUOUS
Status: CANCELLED | OUTPATIENT
Start: 2019-08-12

## 2019-08-12 RX ORDER — ONDANSETRON 2 MG/ML
4 INJECTION INTRAMUSCULAR; INTRAVENOUS
Status: CANCELLED | OUTPATIENT
Start: 2019-08-12 | End: 2019-08-13

## 2019-08-12 RX ORDER — SODIUM CHLORIDE 9 MG/ML
75 INJECTION, SOLUTION INTRAVENOUS CONTINUOUS
Status: DISCONTINUED | OUTPATIENT
Start: 2019-08-12 | End: 2019-08-12 | Stop reason: HOSPADM

## 2019-08-12 RX ORDER — MIDAZOLAM HYDROCHLORIDE 1 MG/ML
.5-2 INJECTION, SOLUTION INTRAMUSCULAR; INTRAVENOUS
Status: DISCONTINUED | OUTPATIENT
Start: 2019-08-12 | End: 2019-08-12 | Stop reason: HOSPADM

## 2019-08-12 RX ORDER — SODIUM CHLORIDE 0.9 % (FLUSH) 0.9 %
5-40 SYRINGE (ML) INJECTION AS NEEDED
Status: CANCELLED | OUTPATIENT
Start: 2019-08-12

## 2019-08-12 RX ORDER — SODIUM CHLORIDE 0.9 % (FLUSH) 0.9 %
5-40 SYRINGE (ML) INJECTION EVERY 8 HOURS
Status: CANCELLED | OUTPATIENT
Start: 2019-08-12

## 2019-08-12 RX ORDER — ACETAMINOPHEN 325 MG/1
650 TABLET ORAL
Status: CANCELLED | OUTPATIENT
Start: 2019-08-12

## 2019-08-12 RX ORDER — FENTANYL CITRATE 50 UG/ML
25-50 INJECTION, SOLUTION INTRAMUSCULAR; INTRAVENOUS
Status: DISCONTINUED | OUTPATIENT
Start: 2019-08-12 | End: 2019-08-12 | Stop reason: HOSPADM

## 2019-08-12 RX ORDER — LIDOCAINE HYDROCHLORIDE 10 MG/ML
6 INJECTION INFILTRATION; PERINEURAL ONCE
Status: COMPLETED | OUTPATIENT
Start: 2019-08-12 | End: 2019-08-12

## 2019-08-12 RX ORDER — HEPARIN SODIUM 200 [USP'U]/100ML
3 INJECTION, SOLUTION INTRAVENOUS CONTINUOUS
Status: DISCONTINUED | OUTPATIENT
Start: 2019-08-12 | End: 2019-08-12 | Stop reason: HOSPADM

## 2019-08-12 RX ORDER — GUAIFENESIN 100 MG/5ML
81-324 LIQUID (ML) ORAL ONCE
Status: COMPLETED | OUTPATIENT
Start: 2019-08-12 | End: 2019-08-12

## 2019-08-12 RX ORDER — HYDROCODONE BITARTRATE AND ACETAMINOPHEN 5; 325 MG/1; MG/1
1 TABLET ORAL
Status: CANCELLED | OUTPATIENT
Start: 2019-08-12

## 2019-08-12 RX ADMIN — SODIUM CHLORIDE 75 ML/HR: 900 INJECTION, SOLUTION INTRAVENOUS at 13:40

## 2019-08-12 RX ADMIN — ASPIRIN 81 MG 324 MG: 81 TABLET ORAL at 13:42

## 2019-08-12 RX ADMIN — LIDOCAINE HYDROCHLORIDE 6 ML: 10 INJECTION, SOLUTION INFILTRATION; PERINEURAL at 14:20

## 2019-08-12 RX ADMIN — IOPAMIDOL 100 ML: 755 INJECTION, SOLUTION INTRAVENOUS at 14:41

## 2019-08-12 RX ADMIN — HEPARIN SODIUM 2 ML: 10000 INJECTION INTRAVENOUS; SUBCUTANEOUS at 14:22

## 2019-08-12 RX ADMIN — FENTANYL CITRATE 50 MCG: 50 INJECTION, SOLUTION INTRAMUSCULAR; INTRAVENOUS at 14:22

## 2019-08-12 RX ADMIN — HEPARIN SODIUM 3 ML/HR: 200 INJECTION, SOLUTION INTRAVENOUS at 14:20

## 2019-08-12 RX ADMIN — MIDAZOLAM HYDROCHLORIDE 2 MG: 1 INJECTION, SOLUTION INTRAMUSCULAR; INTRAVENOUS at 14:20

## 2019-08-12 RX ADMIN — MIDAZOLAM HYDROCHLORIDE 2 MG: 1 INJECTION, SOLUTION INTRAMUSCULAR; INTRAVENOUS at 14:12

## 2019-08-12 RX ADMIN — FENTANYL CITRATE 50 MCG: 50 INJECTION, SOLUTION INTRAMUSCULAR; INTRAVENOUS at 14:12

## 2019-08-12 NOTE — PROCEDURES
[facility]  CARDIAC CATH    Name:  Destini Finley  MR#:  [id number]  :  1965  ACCOUNT #:  [de-identified]  DATE OF SERVICE:  2019    PROCEDURES PERFORMED:  Left heart catheterization, selective coronary arteriography, and left ventriculogram via the right radial approach. PREOPERATIVE DIAGNOSES:  Recurrent chest pain in a patient with morbid obesity and multiple cardiac risk factors. Definitive cardiac catheterization arranged after multiple noninvasive evaluations. POSTOPERATIVE DIAGNOSIS:  Mild nonobstructive coronary artery disease. SURGEON:  Rose Mary Best MD    ASSISTANT:  None. ESTIMATED BLOOD LOSS:  Less than 5 mL. SPECIMENS REMOVED:  None. COMPLICATIONS:  None. IMPLANTS:  None. ANESTHESIA:  The patient received moderate supervised conscious sedation with a total of 4 mg Versed and 50 mcg fentanyl. Sedation start time was 2:15 p.m. with a procedure completion time of 2:39 p.m. Sedation was administered by Ayaan Melton RN, under my supervision. FINDINGS:  As below. TECHNICAL FACTORS:  After informed consent was obtained, the patient was brought to the cardiac catheterization lab. The right radial artery was prepped and draped in usual sterile fashion. Utilizing modified Seldinger technique and micropuncture needle, the right radial artery was entered. A 6-Kiswahili Terumo slender sheath was placed without difficulty. A radial cocktail consisting of 2000 units of heparin, 2 mg of verapamil, and 200 mcg of nitroglycerin was administered. A 5-Kiswahili Tiger 4.0 catheter was advanced but could not engage the left main or right coronary artery. Ultimately, the left main coronary artery was engaged with a JL3.5 catheter and the right coronary artery was engaged with a JR4 catheter. Selective injection verification performed. A pigtail catheter was used to cross the aortic valve and the left ventricle.   Hemodynamic measurements and left ventriculogram were obtained. Left ventricular aortic pressure gradient was obtained by pullback technique. At the conclusion of the diagnostic procedure, the radial sheath was removed and a pneumatic band was placed with good hemostasis. CONTRAST:  Isovue 100. HEMODYNAMIC RESULTS:  1. Aortic pressure is 149/99 with a mean of 108. 2.  Left ventricular end-diastolic pressure 33.  3.  There is no significant gradient across the aortic valve. ANGIOGRAPHIC RESULTS:  1. Left main coronary artery:  Large-caliber vessel. Normal.  2.  LAD:  Medium to large-caliber vessel. A 20% mid stenosis. 3.  First and second diagonal arteries:  Very small-caliber, less than 2-mm vessel. Patent. 4.  Left circumflex:  Medium-caliber, nondominant vessel. A 20% ostial stenosis. 5.  First obtuse marginal:  Medium-caliber vessel. Normal.  6.  Right coronary artery: It is a medium-caliber, dominant vessel. Angiographically normal.  7.  Right PDA:  Small-caliber vessel. Patent. 8.  Right posterolateral branch:  Small to medium-caliber vessel. Patent. 9.  Left ventriculogram performed in the IBARRA projection shows normal left ventricular systolic function. EF 60% to 65%. No focal segmental wall motion abnormalities. CONCLUSIONS:  1.  Mild nonobstructive coronary artery disease. 2.  Normal left ventricular systolic function. PLAN:  Medical therapy.       MD PREMA [email or web address]  D:  08/12/2019 14:40  T:  08/12/2019 14:44  JOB #:  [id number]

## 2019-08-12 NOTE — PROGRESS NOTES
Pt arrived, ambulated to room with no visible problems, planned [facility] for Dr Liliana Hays. Consent signed, Procedure discussed with pt all questions answered voiced understanding. Medications and history discussed with pt. Pt prepped per ordersThe patient has a fraility score of 4-VULNERABLE, based on  Pt arrived in wheelchair, able to ambulate in room and short distance.

## 2019-08-12 NOTE — PROGRESS NOTES
Brief Cardiac Procedure Note    Patient: Mahendra Bergeron MRN: [id number]  SSN: [id number]    YOB: 1965  Age: 53 y.o. Sex: female      Date of Procedure: 8/12/2019     Pre-procedure Diagnosis: Chest pain    Post-procedure Diagnosis: Non-cardiac chest pain    Procedure: Left Heart Catheterization    Brief Description of Procedure: As above    Performed By: Elver Cabrera MD     Assistants: None    Anesthesia: Moderate Sedation    Estimated Blood Loss: Less than 10 mL      Specimens: None    Implants: None    Findings: Mild non-obstructive CAD. Normal LV function. Complications: None    Recommendations: Continue medical therapy.     Signed By: Elver Cabrera MD     August 12, 2019

## 2019-08-12 NOTE — PROGRESS NOTES
TRANSFER - OUT REPORT:    Verbal report given to Johnnie(name) on Sis Connor  being transferred to cpru(unit) for routine progression of care       Report consisted of patients Situation, Background, Assessment and   Recommendations(SBAR). Information from the following report(s) SBAR was reviewed with the receiving nurse. Opportunity for questions and clarification was provided. Procedure: [facility]   Finding Summary: no interventions(cath/pci/pacer settings)  Location: rwrist    Closure Device: trband 12ml(yes/no/description)  Post Site Assessment: no bleeding no hematoma         Intra Procedure Meds:    Versed: 4mg  Fentanyl: 100mcg               Peripheral IV 08/12/19 Right Antecubital (Active)        Post-Procedure Site Assessment (1)  Wound Type: Catheter entry/exit  Location: Wrist  Orientation : Right  Hemostasis : TR Band  Site Assessment: No bleeding, No hematoma                       is allergic to gabapentin; lyrica [pregabalin]; niacin; and percocet [oxycodone-acetaminophen].     Past Medical History:   Diagnosis Date    Diabetes ([facility]     IDDM \    Hypertension     Ill-defined condition     chronic back problems    Morbid obesity ([facility] [facility]     [facility]     DVT     Visit Vitals  /88 (BP Patient Position: Supine)   Pulse 90   Temp 98.1 °F (36.7 °C)   Resp 18   Ht 5' 9\" (1.753 m)   Wt (!) 179.2 kg (395 lb)   SpO2 97%   BMI 58.33 kg/m²

## 2019-08-12 NOTE — DISCHARGE INSTRUCTIONS
HEART CATHETERIZATION/ANGIOGRAPHY DISCHARGE INSTRUCTIONS    1. Check puncture site frequently for swelling or bleeding. If there is any bleeding, lie down and apply pressure over the area with a clean towel or washcloth. Call 911. Notify your doctor for any redness, swelling, drainage, or oozing from the puncture site. Notify your doctor for any fever or chills. 2. If the extremity becomes cold, numb, or painful call [place] [facility] at [phone].  3. Activity should be limited for the next 48 hours. Climb stairs as little as possible and avoid any stooping, bending, or strenuous activity for 48 hours. No heavy lifting (anything over 10 pounds) for 3 days. 4. You may resume your usual diet. Drink more fluids than usual.  5. Have a responsible person drive you home and stay with you for at least 24 hours after your heart catheterization/angiography. Do not drive for the next 24 hours. 6. You may remove bandage from your Right wrist in 24 hours. You may shower in 24 hours. No tub baths, hot tubs, or swimming for 1 week. Do not place any lotions, creams, powders, or ointments over puncture site for 1 week. You may place a clean band-aid over the puncture site each day for 5 days. Change daily. 7. Please continue your medications as prescribed by your physician. I have read the above instructions and have had the opportunity to ask questions.       Patient: ________________________   Date: 8/12/2019    Witness: _______________________   Date: 8/12/2019

## 2019-08-12 NOTE — PROGRESS NOTES
TRANSFER - IN REPORT:    Verbal report received from Emanuel RN(name) on [facility]  being received from cath lab(unit) for routine progression of care      Report consisted of patients Situation, Background, Assessment and   Recommendations(SBAR). Information from the following report(s) Procedure Summary was reviewed with the receiving nurse. Opportunity for questions and clarification was provided. Assessment completed upon patients arrival to unit and care assumed.

## 2019-11-03 ENCOUNTER — ANESTHESIA EVENT (OUTPATIENT)
Dept: ENDOSCOPY | Age: 54
End: 2019-11-03
Payer: MEDICARE

## 2019-11-04 ENCOUNTER — ANESTHESIA (OUTPATIENT)
Dept: ENDOSCOPY | Age: 54
End: 2019-11-04
Payer: MEDICARE

## 2019-11-04 ENCOUNTER — HOSPITAL ENCOUNTER (OUTPATIENT)
Age: 54
Setting detail: OUTPATIENT SURGERY
Discharge: HOME | End: 2019-11-04
Attending: INTERNAL MEDICINE | Admitting: INTERNAL MEDICINE
Payer: MEDICARE

## 2019-11-04 VITALS
DIASTOLIC BLOOD PRESSURE: 62 MMHG | OXYGEN SATURATION: 97 % | WEIGHT: 293 LBS | HEIGHT: 69 IN | SYSTOLIC BLOOD PRESSURE: 141 MMHG | HEART RATE: 76 BPM | BODY MASS INDEX: 43.4 KG/M2 | TEMPERATURE: 98 F | RESPIRATION RATE: 16 BRPM

## 2019-11-04 LAB — GLUCOSE BLD STRIP.AUTO-MCNC: 147 MG/DL (ref 65–100)

## 2019-11-04 PROCEDURE — 74011250636 HC RX REV CODE- 250/636: Performed by: NURSE ANESTHETIST, CERTIFIED REGISTERED

## 2019-11-04 PROCEDURE — 88312 SPECIAL STAINS GROUP 1: CPT

## 2019-11-04 PROCEDURE — 82962 GLUCOSE BLOOD TEST: CPT

## 2019-11-04 PROCEDURE — 76040000026: Performed by: INTERNAL MEDICINE

## 2019-11-04 PROCEDURE — 74011250636 HC RX REV CODE- 250/636: Performed by: ANESTHESIOLOGY

## 2019-11-04 PROCEDURE — 88305 TISSUE EXAM BY PATHOLOGIST: CPT

## 2019-11-04 PROCEDURE — 77030021593 HC FCPS BIOP ENDOSC BSC -A: Performed by: INTERNAL MEDICINE

## 2019-11-04 PROCEDURE — 77030013991 HC SNR POLYP ENDOSC BSC -A: Performed by: INTERNAL MEDICINE

## 2019-11-04 PROCEDURE — 76060000032 HC ANESTHESIA 0.5 TO 1 HR: Performed by: INTERNAL MEDICINE

## 2019-11-04 RX ORDER — SODIUM CHLORIDE, SODIUM LACTATE, POTASSIUM CHLORIDE, CALCIUM CHLORIDE 600; 310; 30; 20 MG/100ML; MG/100ML; MG/100ML; MG/100ML
100 INJECTION, SOLUTION INTRAVENOUS CONTINUOUS
Status: DISCONTINUED | OUTPATIENT
Start: 2019-11-04 | End: 2019-11-04 | Stop reason: HOSPADM

## 2019-11-04 RX ORDER — PROPOFOL 10 MG/ML
INJECTION, EMULSION INTRAVENOUS
Status: DISCONTINUED | OUTPATIENT
Start: 2019-11-04 | End: 2019-11-04 | Stop reason: HOSPADM

## 2019-11-04 RX ORDER — PROPOFOL 10 MG/ML
INJECTION, EMULSION INTRAVENOUS AS NEEDED
Status: DISCONTINUED | OUTPATIENT
Start: 2019-11-04 | End: 2019-11-04 | Stop reason: HOSPADM

## 2019-11-04 RX ADMIN — SODIUM CHLORIDE, SODIUM LACTATE, POTASSIUM CHLORIDE, AND CALCIUM CHLORIDE 100 ML/HR: 600; 310; 30; 20 INJECTION, SOLUTION INTRAVENOUS at 11:42

## 2019-11-04 RX ADMIN — PROPOFOL 50 MG: 10 INJECTION, EMULSION INTRAVENOUS at 12:07

## 2019-11-04 RX ADMIN — PROPOFOL 160 MCG/KG/MIN: 10 INJECTION, EMULSION INTRAVENOUS at 12:15

## 2019-11-04 RX ADMIN — PROPOFOL 50 MG: 10 INJECTION, EMULSION INTRAVENOUS at 12:10

## 2019-11-04 RX ADMIN — PROPOFOL 50 MG: 10 INJECTION, EMULSION INTRAVENOUS at 12:09

## 2019-11-04 RX ADMIN — PROPOFOL 50 MG: 10 INJECTION, EMULSION INTRAVENOUS at 12:15

## 2019-11-04 RX ADMIN — PROPOFOL 50 MG: 10 INJECTION, EMULSION INTRAVENOUS at 12:08

## 2019-11-04 NOTE — H&P
History and Physical for Outpatient Procedures             Date: 2019     History of Present Illness:  Patient presents to undergo EGD and colonoscopy. Patient has history of atypical chest pain and polyps. Past Medical History:   Diagnosis Date    Asthma     CAD (coronary artery disease)     mild non-obstuctive CAD cath 19 echo 19EF 55-60%    Chronic pain     Diabetes ([address]     avg 156, s/s hypo 62    Hypertension     Ill-defined condition     chronic back problems    Morbid obesity ([address]     Thromboembolus ([facility] [address] 2019    dvt x2 right leg     Past Surgical History:   Procedure Laterality Date    HX CERVICAL FUSION  2019    HX  SECTION      HX CHOLECYSTECTOMY      HX GI      hernia repair    HX HEART CATHETERIZATION      no stent    HX HERNIA REPAIR      HX ORTHOPAEDIC Right 2012    trigger-finger release      Family History   Problem Relation Age of Onset    Hypertension Mother     Diabetes Mother     Heart Attack Father     Cancer Brother         colon     Social History     Tobacco Use    Smoking status: Never Smoker    Smokeless tobacco: Never Used   Substance Use Topics    Alcohol use: Never     Frequency: Never        Allergies   Allergen Reactions    Gabapentin Other (comments)     \"causes me to stop breathing. \"       Lyrica [Pregabalin] Hives    Niacin Hives    Percocet [Oxycodone-Acetaminophen] Unknown (comments)     No current facility-administered medications for this encounter. Review of Systems:  A detailed 10 organ review of systems is obtained with pertinent positives as listed in the History of Present Illness. All others are negative. Objective:     Physical Exam:  There were no vitals taken for this visit. General:  Alert and oriented. Heart: Regular rate and rhythm  Lungs:  Clear to auscultation bilaterally  Abdomen: Soft, nontender, nondistended    Impression/Plan:     Proceed with EGD and colonoscopy as planned.  I have discussed with the patient the technique, benefits, alternatives, and risks of these procedures, including medication reaction, immediate or delayed bleeding, or perforation of the gastrointestinal tract.      Signed By: Sylvia Glaser MD     November 4, 2019

## 2019-11-04 NOTE — ANESTHESIA PREPROCEDURE EVALUATION
Relevant Problems   No relevant active problems       Anesthetic History   No history of anesthetic complications            Review of Systems / Medical History  Pertinent labs reviewed    Pulmonary        Sleep apnea: No treatment    Asthma (rare sxs) : well controlled    Comments: DVT 2019   Neuro/Psych   Within defined limits          Comments: polyneuropathy Cardiovascular    Hypertension          CAD (mild, non-obstructive on cath 8/19)    Exercise tolerance: <4 METS     GI/Hepatic/Renal  Within defined limits              Endo/Other    Diabetes: type 2, using insulin    Morbid obesity (super obese)     Other Findings            Physical Exam    Airway  Mallampati: II  TM Distance: 4 - 6 cm  Neck ROM: normal range of motion   Mouth opening: Normal     Cardiovascular  Regular rate and rhythm,  S1 and S2 normal,  no murmur, click, rub, or gallop             Dental  No notable dental hx       Pulmonary  Breath sounds clear to auscultation               Abdominal  GI exam deferred       Other Findings            Anesthetic Plan    ASA: 4  Anesthesia type: total IV anesthesia          Induction: Intravenous  Anesthetic plan and risks discussed with: Patient and Son / Daughter

## 2019-11-04 NOTE — ANESTHESIA POSTPROCEDURE EVALUATION
Procedure(s):  ESOPHAGOGASTRODUODENOSCOPY (EGD)  COLONOSCOPY/ BMI 59  ENDOSCOPIC POLYPECTOMY. total IV anesthesia    Anesthesia Post Evaluation        Patient location during evaluation: PACU  Patient participation: complete - patient participated  Level of consciousness: awake  Pain management: satisfactory to patient  Airway patency: patent  Anesthetic complications: no  Cardiovascular status: hemodynamically stable  Respiratory status: spontaneous ventilation  Hydration status: euvolemic  Post anesthesia nausea and vomiting:  none      Vitals Value Taken Time   /60 11/4/2019  1:07 PM   Temp 36.7 °C (98 °F) 11/4/2019 12:44 PM   Pulse 78 11/4/2019  1:07 PM   Resp 16 11/4/2019 12:57 PM   SpO2 95 % 11/4/2019  1:07 PM   Vitals shown include unvalidated device data.

## 2019-11-04 NOTE — PROCEDURES
EGD and Colonoscopy Procedure Note      Anesthesia/Sedation: MAC IV       Procedure Details:  Informed consent was obtained for the procedure, including sedation risk. Risks of pancreatitis, infection, perforation, hemorrhage, adverse drug reaction and aspiration were discussed. The EGD gastroscope was inserted into the mouth and advanced under direct vision to the second portion of the duodenum. A careful inspection was made as the gastroscope was withdrawn, including a retroflexed view of the proximal stomach; findings and interventions are described below. The patient was placed in the left lateral decubitus position. A rectal examination was performed. The adult colonoscope was inserted into the rectum and advanced under direct vision to the cecum. The quality of the colonic preparation was good. A careful inspection was made as the colonoscope was withdrawn, including a retroflexed view of the rectum; findings and interventions are described below. Findings:       EGD:    ESOPHAGUS: Unremarkable    STOMACH: Moderate sized hiatal hernia. Numerous gastric polyps in body/fundus. All appeared <5mm in size. One of the largest removed with cold biopsy forceps. Otherwise, unremarkable on forward and retroflexed views. DUODENUM: Unremarkable       Colonoscopy:  Anus: Unremarkable  Rectum: Unremarkable  Sigmoid: Diverticulosis, otherwise unremarkable  Descending Colon:  Diverticulosis, otherwise unremarkable  Transverse Colon: Diverticulosis. ~5mm polyp removed with cold snare.  ~2mm polyp removed with cold biopsy forceps. Resection and retrieval of both polyps were complete. , otherwise unremarkable  Ascending Colon: Diverticulosis, otherwise unremarkable  Cecum: Unremarkable  Terminal ileum: Unremarkable    Specimens:   1. Gastric polyp  2. Colon polyps    Estimated Blood Loss: 0-min cc           Complications:   None; patient tolerated the procedure well.            Attending Attestation:  I performed the procedure. Impression:    1. Hiatal hernia  2. Gastric polyps  3. Colon polyps  4. Diverticulosis      Recommendations:  1. Follow up in office  2. Repeat colonoscopy in 3-5 years  3. Call office in 10 days for pathology report  4. Citrucel two tablets daily  5.  PPI trial x 2 months      Minerva Litten, MD  [facility], [address]

## 2019-11-04 NOTE — DISCHARGE INSTRUCTIONS
Gastrointestinal Esophagogastroduodenoscopy (EGD) - Upper Exam Discharge Instructions    1. Call Dr. Elvira Thomas at [phone] for any problems or questions. 2. Contact the doctor's office for follow up appointment as directed. 3. Medication may cause drowsiness for several hours, therefore:  · Do not drive or operate machinery for remainder of the day. · No alcohol today. · Do not make any important or legal decisions for 24 hours. · Do not sign any legal documents for 24 hours. 5. Ordinarily, you may resume regular diet and activity after exam unless otherwise              specified by your physician. 6. For mild soreness in your throat you may use Cepacol throat lozenges or warm               salt-water gargles as needed. Any additional instructions: Instructions given to Laura Carlos and other family members. Gastrointestinal Colonoscopy/Flexible Sigmoidoscopy - Lower Exam Discharge Instructions  1. Call Dr. Elvira Thomas for any problems or questions. 2. Contact the doctors office for follow up appointment as directed  3. Medication may cause drowsiness for several hours, therefore, do not drive or operate machinery for remainder of the day. 4. No alcohol today. 5. Do not make any important decisions such signing legal paperwork. 6. Ordinarily, you may resume regular diet and activity after exam unless otherwise specified by your physician. 7. Because of air put into your colon during exam, you may experience some abdominal distension, relieved by the passage of gas, for several hours. 8. Contact your physician if you have any of the following:  a. Excessive amount of bleeding - large amount when having a bowel movement. Occasional specks of blood with bowel movement would not be unusual.  b. Severe abdominal pain  c. Fever or Chills  9. Polyp Removal - follow these additional instructions  a.  Take Metamucil - 1 tablespoon in juice every morning for 3 days, if needed. b. No Aspirin, Advil, Aleve, Nuprin, Ibuprofen, or medications that contain these drugs for 2 weeks. Any additional instructions:     1. Follow up in office. 2. Repeat colonoscopy in 3-5 years. 3. Call office in 10 days for pathology report. 4. Citrucel two tablets daily. 5. Continue Protonix per Dr Elvira Thomas.   Lillettann Land on Wednesday. Instructions given to Laurakathleen Carlos and other family members.

## 2019-11-04 NOTE — ROUTINE PROCESS
Education provided to pt and daughter. VSS, pt denies any complaints. Pt wheeled to car via wheelchair with RN.

## 2020-02-16 ENCOUNTER — HOSPITAL ENCOUNTER (EMERGENCY)
Age: 55
Discharge: HOME | End: 2020-02-17
Payer: MEDICARE

## 2020-02-16 ENCOUNTER — APPOINTMENT (OUTPATIENT)
Dept: GENERAL RADIOLOGY | Age: 55
End: 2020-02-16
Payer: MEDICARE

## 2020-02-16 DIAGNOSIS — R11.2 NAUSEA AND VOMITING, INTRACTABILITY OF VOMITING NOT SPECIFIED, UNSPECIFIED VOMITING TYPE: Primary | ICD-10-CM

## 2020-02-16 LAB
ALBUMIN SERPL-MCNC: 3.2 G/DL (ref 3.5–5)
ALBUMIN/GLOB SERPL: 0.6 {RATIO} (ref 1.2–3.5)
ALP SERPL-CCNC: 145 U/L (ref 50–136)
ALT SERPL-CCNC: 22 U/L (ref 12–65)
ANION GAP SERPL CALC-SCNC: 8 MMOL/L (ref 7–16)
AST SERPL-CCNC: 15 U/L (ref 15–37)
BASOPHILS # BLD: 0.1 K/UL (ref 0–0.2)
BASOPHILS NFR BLD: 0 % (ref 0–2)
BILIRUB SERPL-MCNC: 1.3 MG/DL (ref 0.2–1.1)
BNP SERPL-MCNC: 174 PG/ML (ref 5–125)
BUN SERPL-MCNC: 28 MG/DL (ref 6–23)
CALCIUM SERPL-MCNC: 9.4 MG/DL (ref 8.3–10.4)
CHLORIDE SERPL-SCNC: 98 MMOL/L (ref 98–107)
CO2 SERPL-SCNC: 31 MMOL/L (ref 21–32)
CREAT SERPL-MCNC: 1.32 MG/DL (ref 0.6–1)
DIFFERENTIAL METHOD BLD: ABNORMAL
EOSINOPHIL # BLD: 0.1 K/UL (ref 0–0.8)
EOSINOPHIL NFR BLD: 0 % (ref 0.5–7.8)
ERYTHROCYTE [DISTWIDTH] IN BLOOD BY AUTOMATED COUNT: 14.5 % (ref 11.9–14.6)
GLOBULIN SER CALC-MCNC: 5.2 G/DL (ref 2.3–3.5)
GLUCOSE SERPL-MCNC: 226 MG/DL (ref 65–100)
HCT VFR BLD AUTO: 45.9 % (ref 35.8–46.3)
HGB BLD-MCNC: 14.4 G/DL (ref 11.7–15.4)
IMM GRANULOCYTES # BLD AUTO: 0.1 K/UL (ref 0–0.5)
IMM GRANULOCYTES NFR BLD AUTO: 0 % (ref 0–5)
LYMPHOCYTES # BLD: 2.3 K/UL (ref 0.5–4.6)
LYMPHOCYTES NFR BLD: 17 % (ref 13–44)
MAGNESIUM SERPL-MCNC: 1.9 MG/DL (ref 1.8–2.4)
MCH RBC QN AUTO: 29.3 PG (ref 26.1–32.9)
MCHC RBC AUTO-ENTMCNC: 31.4 G/DL (ref 31.4–35)
MCV RBC AUTO: 93.5 FL (ref 79.6–97.8)
MONOCYTES # BLD: 0.5 K/UL (ref 0.1–1.3)
MONOCYTES NFR BLD: 4 % (ref 4–12)
NEUTS SEG # BLD: 10.7 K/UL (ref 1.7–8.2)
NEUTS SEG NFR BLD: 78 % (ref 43–78)
NRBC # BLD: 0 K/UL (ref 0–0.2)
PLATELET # BLD AUTO: 384 K/UL (ref 150–450)
PMV BLD AUTO: 10.4 FL (ref 9.4–12.3)
POTASSIUM SERPL-SCNC: 3.4 MMOL/L (ref 3.5–5.1)
PROT SERPL-MCNC: 8.4 G/DL (ref 6.3–8.2)
RBC # BLD AUTO: 4.91 M/UL (ref 4.05–5.2)
SODIUM SERPL-SCNC: 137 MMOL/L (ref 136–145)
TROPONIN I SERPL-MCNC: <0.02 NG/ML (ref 0.02–0.05)
WBC # BLD AUTO: 13.7 K/UL (ref 4.3–11.1)

## 2020-02-16 PROCEDURE — 80053 COMPREHEN METABOLIC PANEL: CPT

## 2020-02-16 PROCEDURE — 96374 THER/PROPH/DIAG INJ IV PUSH: CPT

## 2020-02-16 PROCEDURE — 85025 COMPLETE CBC W/AUTO DIFF WBC: CPT

## 2020-02-16 PROCEDURE — 74011250636 HC RX REV CODE- 250/636

## 2020-02-16 PROCEDURE — 71045 X-RAY EXAM CHEST 1 VIEW: CPT

## 2020-02-16 PROCEDURE — 93005 ELECTROCARDIOGRAM TRACING: CPT

## 2020-02-16 PROCEDURE — 74011250637 HC RX REV CODE- 250/637

## 2020-02-16 PROCEDURE — 84484 ASSAY OF TROPONIN QUANT: CPT

## 2020-02-16 PROCEDURE — 99285 EMERGENCY DEPT VISIT HI MDM: CPT

## 2020-02-16 PROCEDURE — 83880 ASSAY OF NATRIURETIC PEPTIDE: CPT

## 2020-02-16 PROCEDURE — 83735 ASSAY OF MAGNESIUM: CPT

## 2020-02-16 PROCEDURE — 93005 ELECTROCARDIOGRAM TRACING: CPT | Performed by: EMERGENCY MEDICINE

## 2020-02-16 RX ORDER — ONDANSETRON HYDROCHLORIDE 8 MG/1
8 TABLET, FILM COATED ORAL
Qty: 12 TAB | Refills: 0 | Status: SHIPPED | OUTPATIENT
Start: 2020-02-16 | End: 2020-08-17

## 2020-02-16 RX ORDER — ONDANSETRON 2 MG/ML
4 INJECTION INTRAMUSCULAR; INTRAVENOUS
Status: COMPLETED | OUTPATIENT
Start: 2020-02-16 | End: 2020-02-16

## 2020-02-16 RX ORDER — DIAZEPAM 5 MG/1
5 TABLET ORAL
Status: COMPLETED | OUTPATIENT
Start: 2020-02-16 | End: 2020-02-16

## 2020-02-16 RX ADMIN — ONDANSETRON 4 MG: 2 INJECTION INTRAMUSCULAR; INTRAVENOUS at 23:18

## 2020-02-16 RX ADMIN — DIAZEPAM 5 MG: 5 TABLET ORAL at 23:17

## 2020-02-17 VITALS
DIASTOLIC BLOOD PRESSURE: 56 MMHG | TEMPERATURE: 98 F | HEART RATE: 76 BPM | BODY MASS INDEX: 43.4 KG/M2 | HEIGHT: 69 IN | WEIGHT: 293 LBS | OXYGEN SATURATION: 97 % | SYSTOLIC BLOOD PRESSURE: 135 MMHG | RESPIRATION RATE: 18 BRPM

## 2020-02-17 LAB
ATRIAL RATE: 86 BPM
CALCULATED P AXIS, ECG09: 11 DEGREES
CALCULATED R AXIS, ECG10: 41 DEGREES
CALCULATED T AXIS, ECG11: 2 DEGREES
DIAGNOSIS, 93000: NORMAL
P-R INTERVAL, ECG05: 146 MS
Q-T INTERVAL, ECG07: 382 MS
QRS DURATION, ECG06: 108 MS
QTC CALCULATION (BEZET), ECG08: 457 MS
VENTRICULAR RATE, ECG03: 86 BPM

## 2020-02-17 NOTE — DISCHARGE INSTRUCTIONS
Patient Education        Nausea and Vomiting: Care Instructions  Your Care Instructions    When you are nauseated, you may feel weak and sweaty and notice a lot of saliva in your mouth. Nausea often leads to vomiting. Most of the time you do not need to worry about nausea and vomiting, but they can be signs of other illnesses. Two common causes of nausea and vomiting are stomach flu and food poisoning. Nausea and vomiting from viral stomach flu will usually start to improve within 24 hours. Nausea and vomiting from food poisoning may last from 12 to 48 hours. The doctor has checked you carefully, but problems can develop later. If you notice any problems or new symptoms, get medical treatment right away. Follow-up care is a key part of your treatment and safety. Be sure to make and go to all appointments, and call your doctor if you are having problems. It's also a good idea to know your test results and keep a list of the medicines you take. How can you care for yourself at home? · To prevent dehydration, drink plenty of fluids, enough so that your urine is light yellow or clear like water. Choose water and other caffeine-free clear liquids until you feel better. If you have kidney, heart, or liver disease and have to limit fluids, talk with your doctor before you increase the amount of fluids you drink. · Rest in bed until you feel better. · When you are able to eat, try clear soups, mild foods, and liquids until all symptoms are gone for 12 to 48 hours. Other good choices include dry toast, crackers, cooked cereal, and gelatin dessert, such as Jell-O. When should you call for help? Call 911 anytime you think you may need emergency care. For example, call if:    · You passed out (lost consciousness).    Call your doctor now or seek immediate medical care if:    · You have symptoms of dehydration, such as:  ? Dry eyes and a dry mouth. ? Passing only a little dark urine. ?  Feeling thirstier than usual.   · You have new or worsening belly pain.     · You have a new or higher fever.     · You vomit blood or what looks like coffee grounds.    Watch closely for changes in your health, and be sure to contact your doctor if:    · You have ongoing nausea and vomiting.     · Your vomiting is getting worse.     · Your vomiting lasts longer than 2 days.     · You are not getting better as expected. Where can you learn more? Go to http://nneka-francois.info/. Enter 25 [id number] in the search box to learn more about \"Nausea and Vomiting: Care Instructions. \"  Current as of: June 26, 2019  Content Version: 12.2  © 3517-7077 [employer], [employer]. Care instructions adapted under license by [employer] (which disclaims liability or warranty for this information). If you have questions about a medical condition or this instruction, always ask your healthcare professional. [address] any warranty or liability for your use of this information.

## 2020-02-17 NOTE — ED TRIAGE NOTES
Patient arrives to ED from home complaining of vomiting, weakness, and palpitations. Patient states she had chills this morning. Denies getting flu shot this year. Patient has cardiac history. Patient states last night she had a lot of fluid accumulation so she took an extra lasix pill and started to have muscle cramps after that and feels really dry. Patient states she has been short of breath.

## 2020-02-17 NOTE — ED NOTES
I have reviewed discharge instructions with the patient. The patient verbalized understanding. Patient left ED via Discharge Method: ambulatory to Home with family. Opportunity for questions and clarification provided. Patient given 1 scripts. To continue your aftercare when you leave the hospital, you may receive an automated call from our care team to check in on how you are doing. This is a free service and part of our promise to provide the best care and service to meet your aftercare needs.  If you have questions, or wish to unsubscribe from this service please call [phone]. Thank you for Choosing our [facility] Emergency Department.

## 2020-02-17 NOTE — ED PROVIDER NOTES
79-year-old female complaining of \"not feeling well\". She states that she is had nausea and vomiting today off and on for the last 3 days as well. Patient thought that she is fluid overloaded so she took extra Lasix and now her muscles are cramping. Lethargy   This is a new problem. The current episode started 2 days ago. The problem occurs constantly. The problem has not changed since onset. Pertinent negatives include no abdominal pain and no shortness of breath. Nothing aggravates the symptoms. Nothing relieves the symptoms. Treatments tried: Extra Lasix. The treatment provided no relief.         Past Medical History:   Diagnosis Date    Asthma     CAD (coronary artery disease)     mild non-obstuctive CAD cath 19 echo 19EF 55-60%    Chronic pain     Diabetes ([address]     avg 156, s/s hypo 62    Hypertension     Ill-defined condition     chronic back problems    Morbid obesity ([address]     Thromboembolus ([address] 2019    dvt x2 right leg       Past Surgical History:   Procedure Laterality Date    COLONOSCOPY N/A 2019    COLONOSCOPY/ BMI 59 performed by Wallace Shelton MD at [address] [place] [address] HX CERVICAL FUSION  2019    HX  SECTION      HX CHOLECYSTECTOMY      HX GI      hernia repair    HX HEART CATHETERIZATION      no stent    HX HERNIA REPAIR      HX ORTHOPAEDIC Right 2012    trigger-finger release         Family History:   Problem Relation Age of Onset    Hypertension Mother     Diabetes Mother     Heart Attack Father     Cancer Brother         colon       Social History     Socioeconomic History    Marital status:      Spouse name: Not on file    Number of children: Not on file    Years of education: Not on file    Highest education level: Not on file   Occupational History    Not on file   Social Needs    Financial resource strain: Not on file    Food insecurity:     Worry: Not on file     Inability: Not on file    Transportation needs:     Medical: Not on file     Non-medical: Not on file   Tobacco Use    Smoking status: Never Smoker    Smokeless tobacco: Never Used   Substance and Sexual Activity    Alcohol use: Never     Frequency: Never    Drug use: Never    Sexual activity: Not on file   Lifestyle    Physical activity:     Days per week: Not on file     Minutes per session: Not on file    Stress: Not on file   Relationships    Social connections:     Talks on phone: Not on file     Gets together: Not on file     Attends Rastafari service: Not on file     Active member of club or organization: Not on file     Attends meetings of clubs or organizations: Not on file     Relationship status: Not on file    Intimate partner violence:     Fear of current or ex partner: Not on file     Emotionally abused: Not on file     Physically abused: Not on file     Forced sexual activity: Not on file   Other Topics Concern    Not on file   Social History Narrative    Not on file         ALLERGIES: Gabapentin; Lyrica [pregabalin]; Niacin; and Percocet [oxycodone-acetaminophen]    Review of Systems   Constitutional: Negative. Negative for activity change. HENT: Negative. Eyes: Negative. Respiratory: Negative. Negative for shortness of breath. Cardiovascular: Negative. Gastrointestinal: Negative. Negative for abdominal pain. Genitourinary: Negative. Musculoskeletal: Negative. Skin: Negative. Neurological: Negative. Psychiatric/Behavioral: Negative. All other systems reviewed and are negative. Vitals:    02/16/20 2056 02/16/20 2110 02/16/20 2122 02/16/20 2142   BP: (!) 153/103 159/86 151/79 (!) 164/95   Pulse: 87 84 89 80   Resp: 16 17 17 16   Temp: 98 °F (36.7 °C)      SpO2: 95% 96% 93% 94%   Weight: (!) 177.8 kg (392 lb)      Height: 5' 9\" (1.753 m)               Physical Exam  Vitals signs and nursing note reviewed. Constitutional:       General: She is not in acute distress. Appearance: She is well-developed.  She is not diaphoretic. HENT:      Head: Normocephalic and atraumatic. Right Ear: External ear normal.      Left Ear: External ear normal.      Nose: Nose normal.      Mouth/Throat:      Pharynx: No oropharyngeal exudate. Eyes:      General: No scleral icterus. Right eye: No discharge. Left eye: No discharge. Conjunctiva/sclera: Conjunctivae normal.      Pupils: Pupils are equal, round, and reactive to light. Neck:      Musculoskeletal: Normal range of motion and neck supple. Vascular: No JVD. Trachea: No tracheal deviation. Cardiovascular:      Rate and Rhythm: Normal rate and regular rhythm. Pulmonary:      Effort: Pulmonary effort is normal. No respiratory distress. Breath sounds: Normal breath sounds. No stridor. No wheezing. Chest:      Chest wall: No tenderness. Abdominal:      General: Bowel sounds are normal. There is no distension. Palpations: Abdomen is soft. There is no mass. Tenderness: There is no abdominal tenderness. Musculoskeletal: Normal range of motion. General: No tenderness. Skin:     General: Skin is warm and dry. Coloration: Skin is not pale. Findings: No erythema or rash. Neurological:      Mental Status: She is alert and oriented to person, place, and time. Cranial Nerves: No cranial nerve deficit. Psychiatric:         Behavior: Behavior normal.         Thought Content: Thought content normal.          MDM  Number of Diagnoses or Management Options  Nausea and vomiting, intractability of vomiting not specified, unspecified vomiting type:   Diagnosis management comments: Patient is not tachypneic tachycardic or hypoxic. Nausea was treated she had no further vomiting in the emergency department. No diarrhea. Abdominal was soft nontender findings. We will place her on [facility] as an outpatient and have her follow-up closely with her primary care doctor tomorrow.        Amount and/or Complexity of Data Reviewed  Clinical lab tests: ordered and reviewed  Tests in the radiology section of CPT®: ordered and reviewed  Tests in the medicine section of CPT®: ordered and reviewed           Procedures

## 2020-08-17 PROBLEM — R80.9 MICROALBUMINURIA: Status: ACTIVE | Noted: 2018-03-20

## 2020-08-17 PROBLEM — N39.490 OVERFLOW INCONTINENCE OF URINE: Status: ACTIVE | Noted: 2019-07-29

## 2020-08-17 PROBLEM — R41.3 POOR LONG-TERM MEMORY: Status: ACTIVE | Noted: 2019-07-29

## 2020-08-17 PROBLEM — N39.3 STRESS INCONTINENCE OF URINE: Status: ACTIVE | Noted: 2019-09-17

## 2020-08-17 PROBLEM — F41.9 ANXIETY: Status: ACTIVE | Noted: 2019-07-29

## 2020-08-17 PROBLEM — K21.9 GASTROESOPHAGEAL REFLUX DISEASE: Status: ACTIVE | Noted: 2017-11-02

## 2020-08-17 PROBLEM — I25.10 CORONARY ARTERIOSCLEROSIS: Status: ACTIVE | Noted: 2019-07-29

## 2020-09-21 ENCOUNTER — APPOINTMENT (OUTPATIENT)
Dept: GENERAL RADIOLOGY | Age: 55
End: 2020-09-21
Attending: STUDENT IN AN ORGANIZED HEALTH CARE EDUCATION/TRAINING PROGRAM
Payer: MEDICARE

## 2020-09-21 ENCOUNTER — HOSPITAL ENCOUNTER (EMERGENCY)
Age: 55
Discharge: HOME | End: 2020-09-21
Attending: STUDENT IN AN ORGANIZED HEALTH CARE EDUCATION/TRAINING PROGRAM
Payer: MEDICARE

## 2020-09-21 VITALS
HEART RATE: 86 BPM | HEIGHT: 69 IN | OXYGEN SATURATION: 98 % | BODY MASS INDEX: 43.4 KG/M2 | DIASTOLIC BLOOD PRESSURE: 79 MMHG | SYSTOLIC BLOOD PRESSURE: 144 MMHG | TEMPERATURE: 98.3 F | WEIGHT: 293 LBS | RESPIRATION RATE: 18 BRPM

## 2020-09-21 DIAGNOSIS — M25.531 WRIST PAIN, ACUTE, RIGHT: Primary | ICD-10-CM

## 2020-09-21 PROCEDURE — 74011250637 HC RX REV CODE- 250/637: Performed by: STUDENT IN AN ORGANIZED HEALTH CARE EDUCATION/TRAINING PROGRAM

## 2020-09-21 PROCEDURE — 73110 X-RAY EXAM OF WRIST: CPT

## 2020-09-21 PROCEDURE — 99283 EMERGENCY DEPT VISIT LOW MDM: CPT

## 2020-09-21 RX ORDER — DEXAMETHASONE SODIUM PHOSPHATE 100 MG/10ML
10 INJECTION INTRAMUSCULAR; INTRAVENOUS
Status: DISCONTINUED | OUTPATIENT
Start: 2020-09-21 | End: 2020-09-21 | Stop reason: HOSPADM

## 2020-09-21 RX ORDER — IBUPROFEN 800 MG/1
800 TABLET ORAL
Qty: 20 TAB | Refills: 0 | Status: SHIPPED | OUTPATIENT
Start: 2020-09-21 | End: 2020-09-28

## 2020-09-21 RX ORDER — HYDROCODONE BITARTRATE AND ACETAMINOPHEN 5; 325 MG/1; MG/1
1 TABLET ORAL
Status: COMPLETED | OUTPATIENT
Start: 2020-09-21 | End: 2020-09-21

## 2020-09-21 RX ADMIN — HYDROCODONE BITARTRATE AND ACETAMINOPHEN 1 TABLET: 5; 325 TABLET ORAL at 07:39

## 2020-09-21 NOTE — DISCHARGE INSTRUCTIONS
Ice the area regularly. Use a splint directed. Take the medication for pain as needed. You may also take a full dose of extra strength Tylenol which is 2 of the 500 mg capsules 3 times a day. Arrange follow-up with the orthopedic specialist listed. Return for worsening symptoms, concerns or questions.

## 2020-09-21 NOTE — ED TRIAGE NOTES
Pt arrives with complaints of right wrist pain since 0100 this AM. No known injury or trauma. PMS intact.

## 2020-09-21 NOTE — ED NOTES
I have reviewed discharge instructions with the patient. The patient verbalized understanding. Patient left ED via Discharge Method: wheelchair to Home with family. Opportunity for questions and clarification provided. Patient given 0 scripts. To continue your aftercare when you leave the hospital, you may receive an automated call from our care team to check in on how you are doing. This is a free service and part of our promise to provide the best care and service to meet your aftercare needs.  If you have questions, or wish to unsubscribe from this service please call [phone]. Thank you for Choosing our [employer] Emergency Department.

## 2020-09-21 NOTE — ED PROVIDER NOTES
13-year-old female patient arrives to the emergency department with complaints of right wrist pain. Patient noticed pain at approximate 1 AM woke her from sleep last evening. She denies falls or trauma to the area. Notes aching discomfort of the posterior aspect of the wrist that does not radiate. This limits her mobility in the wrist however and she has noted some swelling. She does mention some subtle redness initially but states this is improved. Denies any suspected insect bites, fever or chills associated with symptoms. Patient denies any changes in her activities, reports no repetitive movements with her hand.            Past Medical History:   Diagnosis Date    Asthma     CAD (coronary artery disease)     mild non-obstuctive CAD cath 19 echo 19EF 55-60%    Chronic pain     Diabetes ([address]     avg 156, s/s hypo 62    Hypertension     Ill-defined condition     chronic back problems    Morbid obesity ([address]     [facility] 2019    dvt x2 right leg       Past Surgical History:   Procedure Laterality Date    COLONOSCOPY N/A 2019    COLONOSCOPY/ BMI 59 performed by Alcira Land MD at [facility] HX CERVICAL FUSION  2019    HX  SECTION      HX CHOLECYSTECTOMY      HX GI      hernia repair    HX HEART CATHETERIZATION      no stent    HX HERNIA REPAIR      HX ORTHOPAEDIC Right 2012    trigger-finger release         Family History:   Problem Relation Age of Onset    Hypertension Mother     Diabetes Mother     Heart Attack Father     Cancer Brother         colon       Social History     Socioeconomic History    Marital status:      Spouse name: Not on file    Number of children: Not on file    Years of education: Not on file    Highest education level: Not on file   Occupational History    Not on file   Social Needs    Financial resource strain: Not on file    Food insecurity     Worry: Not on file     Inability: Not on file   Lisa Marino Transportation needs     Medical: Not on file     Non-medical: Not on file   Tobacco Use    Smoking status: Never Smoker    Smokeless tobacco: Never Used   Substance and Sexual Activity    Alcohol use: Never     Frequency: Never    Drug use: Never    Sexual activity: Not on file   Lifestyle    Physical activity     Days per week: Not on file     Minutes per session: Not on file    Stress: Not on file   Relationships    Social connections     Talks on phone: Not on file     Gets together: Not on file     Attends Orthodoxy service: Not on file     Active member of club or organization: Not on file     Attends meetings of clubs or organizations: Not on file     Relationship status: Not on file    Intimate partner violence     Fear of current or ex partner: Not on file     Emotionally abused: Not on file     Physically abused: Not on file     Forced sexual activity: Not on file   Other Topics Concern    Not on file   Social History Narrative    Not on file         ALLERGIES: Gabapentin; Lyrica [pregabalin]; Niacin; Nystatin; and Percocet [oxycodone-acetaminophen]    Review of Systems   All other systems reviewed and are negative. Vitals:    09/21/20 0714   BP: (!) 144/79   Pulse: 86   Resp: 18   Temp: 98.3 °F (36.8 °C)   SpO2: 98%   Weight: (!) 181.9 kg (401 lb)   Height: 5' 9\" (1.753 m)            Physical Exam  Vitals signs and nursing note reviewed. Constitutional:       Appearance: Normal appearance. HENT:      Head: Normocephalic and atraumatic. Nose: Nose normal.      Mouth/Throat:      Mouth: Mucous membranes are dry. Eyes:      Extraocular Movements: Extraocular movements intact. Pupils: Pupils are equal, round, and reactive to light. Neck:      Musculoskeletal: Normal range of motion. Cardiovascular:      Pulses: Normal pulses. Pulmonary:      Effort: Pulmonary effort is normal.   Abdominal:      General: Abdomen is flat.    Musculoskeletal:      Comments: Patient has reproducible discomfort with palpation of the posterior aspect of the right wrist.  There is a visible area of swelling just proximal to the bend of the wrist and slightly. There is no reproducible tenderness with palpation of the snuffbox or generally through the hand. Brisk capillary refill is noted and pulses are palpable over the ulnar and radial aspect. Skin:     General: Skin is warm and dry. Capillary Refill: Capillary refill takes less than 2 seconds. Neurological:      General: No focal deficit present. Mental Status: She is alert. Psychiatric:         Mood and Affect: Mood normal.          MDM  Number of Diagnoses or Management Options  Diagnosis management comments: Orders for x-ray placed as well as ice to the area. We will give a small dose of medication for pain. Suspect tendinitis, evidence of cellulitis on exam and patient denies fever associated with symptoms. She is scheduled for follow-up with [address] [place] [address] for another issue later this week and will be encouraged to keep this appointment. Voice dictation software was used during the making of this note. This software is not perfect and grammatical and other typographical errors may be present. This note has been proofread, but may still contain errors.   [address] [facility]; 9/21/2020 @7:25 AM   ===================================================================         Amount and/or Complexity of Data Reviewed  Tests in the radiology section of CPT®: ordered and reviewed  Tests in the medicine section of CPT®: ordered and reviewed    Risk of Complications, Morbidity, and/or Mortality  Presenting problems: moderate  Diagnostic procedures: low  Management options: moderate    Patient Progress  Patient progress: stable         Procedures

## 2020-09-21 NOTE — ED NOTES
Patient's daughter called and states that [address] [employer] does not have the prescriptions so I have advised her to call them and call us that they were electronically prescribed so they will need to call us if they do not have them as we did send them.

## 2020-10-07 PROBLEM — E11.21 TYPE 2 DIABETES WITH NEPHROPATHY (HCC): Status: ACTIVE | Noted: 2020-10-07

## 2020-10-13 ENCOUNTER — HOSPITAL ENCOUNTER (OUTPATIENT)
Dept: GENERAL RADIOLOGY | Age: 55
Discharge: HOME | End: 2020-10-13

## 2020-10-13 DIAGNOSIS — R06.02 SHORTNESS OF BREATH: ICD-10-CM

## 2020-10-21 ENCOUNTER — HOSPITAL ENCOUNTER (OUTPATIENT)
Dept: PHYSICAL THERAPY | Age: 55
Discharge: HOME | End: 2020-10-21
Attending: INTERNAL MEDICINE
Payer: MEDICARE

## 2020-10-21 DIAGNOSIS — R60.9 EDEMA, UNSPECIFIED TYPE: ICD-10-CM

## 2020-10-21 DIAGNOSIS — R60.0 BILATERAL LEG EDEMA: ICD-10-CM

## 2020-10-21 PROCEDURE — 97140 MANUAL THERAPY 1/> REGIONS: CPT

## 2020-10-21 PROCEDURE — 97166 OT EVAL MOD COMPLEX 45 MIN: CPT

## 2020-10-21 NOTE — THERAPY EVALUATION
Clinton Patel  : 1965  Primary: [employer] [employer]  [place] [facility] Therapy  [address], [address]  [phone]   [phone]         OUTPATIENT OCCUPATIONAL THERAPY: Initial Assessment and Daily Note 10/21/2020    ICD-10: Treatment Diagnosis: I89.0, Lymphedema not elsewhere classified  I89.022, Lymphedema with obesity  I89.026, Lymphedema with diabetes  Precautions/Allergies:   Gabapentin; Lyrica [pregabalin]; Niacin; Nystatin; and Percocet [oxycodone-acetaminophen]   MD Orders: eval and treat MEDICAL/REFERRING DIAGNOSIS:   Edema, unspecified type [R60.9]  Bilateral leg edema [R60.0]   DATE OF ONSET: Chronic   REFERRING PHYSICIAN: Ne Orosco*  RETURN PHYSICIAN APPOINTMENT: to be determined      INITIAL ASSESSMENT:  Ms. Sydni Sr presents with leg swelling that has been progressively getting worse \"for years. \"  Ms. Sydni Sr presents with bilateral LE 2+ pitting edema, hyperpigmentation, mild lymphostatic fibrosis, positive Stemmer sign. She report her pain stays around 6/10 but shoots up to 10/10 with sitting too long. She is on disability for neck and back problems, since . She lives with her daughters and requires moderate assistance to complete ADLs and IADLs. She receives CLTC services in the home. She arrived in a wheelchair due to severe shortness of breath and difficulty walking back to the treatment area. She recently had a cardiac work up and reports her cardiac status is good and the swelling in her legs is not related to her heart. Ms. Sydni Sr spends most of the day in bed. She is diabetic as well. Ms. Sydni Sr will benefit from complete decongestive therapy, light exercise and compression bandaging to reduce leg swelling. Plan to transition to compression garments to manage reduction achieved in therapy.   Ms. Sydni Sr is in agreement with POC and goals.   PLAN OF CARE:   PROBLEM LIST:  1. Increased Pain  2. Decreased Activity Tolerance  3. Edema/Girth  4. Decreased Knowledge of Precautions  5. Decreased Skin Integrity/Hygeine  6. Decreased [place] with Home Exercise Program INTERVENTIONS PLANNED  1. Skin care  2. Compression bandaging  3. Fitting for compression garment(s)  4. Manual therapy/Manual lymph drainage  5. Therapeutic exercise/Therapeutic activities  6. Patient Education  7. Compression pump trial prn  8.  kinesiotaping    TREATMENT PLAN:  Effective Dates: 10/21/2020 TO 1/19/2021 (90 days). Frequency/Duration: 2 times a week for 90 Day(s)  Will adjust frequency and duration as progress indicates. GOALS: (Goals have been discussed and agreed upon with patient.)  Short-Term Functional Goals: Time Frame: 45 days  1. The patient/caregiver will verbalize understanding of lymphedema precautions. 2. Patient will be independent with skin care regimen to decrease risk of cellulitis. 3. The patient/caregiver will be independent at donning and doffing  lower extremity compression bandages. 4. The patient/caregiver will be independent with self-manual lymph drainage techniques and show decrease in limb volume. 5. Patient will be independent in lymphatic exercises. Discharge Goals: Time Frame: 90 days  1. Patient's bilateral lower extremity circumferential measurements will decrease 5 cm or greater on volumetric graph to maximize functional use in ADL's.    2. The patient/caregiver will be independent with home management of lymphedema. 3. Patient/caregiver will be independent donning and doffing bilateral lower extremity compression garment. Rehabilitation Potential For Stated Goals: Guarded  Regarding Neva Irene's therapy, I certify that the treatment plan above will be carried out by a therapist or under their direction.   Thank you for this referral,  Debbie Timmons OT     Referring Physician Signature: Lois Gill* _________________________  Date _________            The information in this section was collected on 10/21/2020   (except where otherwise noted). OCCUPATIONAL PROFILE & HISTORY:   History of Present Injury/Illness (Reason for Referral):  Ms. Franc Mi presents with leg swelling that has been progressively getting worse \"for years. \"  Ms. Franc Mi presents with bilateral LE 2+ pitting edema, hyperpigmentation, mild lymphostatic fibrosis, positive Stemmer sign. She report her pain stays around 6/10 but shoots up to 10/10 with sitting too long. She is on disability for neck and back problems, since . She lives with her daughters and requires moderate assistance to complete ADLs and IADLs. She receives CLTC services in the home. She arrived in a wheelchair due to severe shortness of breath and difficulty walking back to the treatment area. She recently had a cardiac work up and reports her cardiac status is good and the swelling in her legs is not related to her heart. Ms. Franc Mi spends most of the day in bed. She is diabetic as well. Ms. Franc Mi will benefit from complete decongestive therapy, light exercise and compression bandaging to reduce leg swelling. Plan to transition to compression garments to manage reduction achieved in therapy. Ms. Franc Mi is in agreement with POC and goals. Past Medical History/Comorbidities:   Ms. Franc Mi  has a past medical history of Asthma, CAD (coronary artery disease), Chronic pain, Diabetes ([address] Hypertension, Ill-defined condition, Morbid obesity ([address] and Thromboembolus ([address] (2019). Ms. Franc Mi  has a past surgical history that includes hx  section; hx orthopaedic (Right, ); hx gi; hx hernia repair; hx cervical fusion (2019); hx heart catheterization; hx cholecystectomy; and colonoscopy (N/A, 2019).   Social History/Living Environment:    lives with daughters; oldest daughter provides CLTC home assistance   Prior Level of Function/Work/Activity:  Moderate assistance/on disability/sedentary  Dominant Side:         RIGHT  Previous Treatment Approaches:          No treatment for LE lymphedema   Ambulatory/Rehab Services H2 Model Falls Risk Assessment    Risk Factors:       No Risk Factors Identified Ability to Rise from Chair:       (4)  Unable to rise without assistance    Falls Prevention Plan:       Physical Limitations to Exercise (specify):  impaired mobility   Total: (5 or greater = High Risk): 4    ©2010 [facility] of [employer]. All Rights Reserved. [facility] [employer] Patent #[id number]. Federal Law prohibits the replication, distribution or use without written permission from [facility] [employer]     Current Medications:    Current Outpatient Medications:     torsemide (DEMADEX) 10 mg tablet, Take 1 Tab by mouth daily. , Disp: 30 Tab, Rfl: 3    insulin detemir U-100 (Levemir U-100 Insulin) 100 unit/mL injection, 48 Units by SubCUTAneous route nightly., Disp: 30 mL, Rfl: 1    glimepiride (AMARYL) 4 mg tablet, Take 1 Tab by mouth every morning., Disp: 90 Tab, Rfl: 3    potassium chloride (KLOR-CON) 10 mEq tablet, Take 1 Tab by mouth daily for 30 days. , Disp: 30 Tab, Rfl: 3    mirabegron ER (MYRBETRIQ) 25 mg ER tablet, Take 25 mg by mouth daily. , Disp: , Rfl:     liraglutide (VICTOZA) 0.6 mg/0.1 mL (18 mg/3 mL) pnij, 1.8 mg by SubCUTAneous route daily. Indications: type 2 diabetes mellitus, Disp: 9 mL, Rfl: 6    pantoprazole (PROTONIX) 40 mg tablet, Take 1 Tab by mouth daily for 90 days. , Disp: 30 Tab, Rfl: 2    hydrALAZINE (APRESOLINE) 50 mg tablet, Take 1 Tab by mouth two (2) times a day., Disp: 60 Tab, Rfl: 3    irbesartan 300 mg tab 300 mg, hydroCHLOROthiazide 12.5 mg cap 12.5 mg, Take  by mouth daily. , Disp: , Rfl:     rivaroxaban (XARELTO) 20 mg tab tablet, Take  by mouth daily. , Disp: , Rfl:     nebivolol (BYSTOLIC) 10 mg tablet, Take 10 mg by mouth daily. , Disp: , Rfl:             Date Last Reviewed:  10/21/2020 Complexity Level : Expanded review of therapy/medical records (1-2):  MODERATE COMPLEXITY   ASSESSMENT OF OCCUPATIONAL PERFORMANCE:   Palpation:          2+ PITTING EDEMA BILATERALLY  ROM:          LIMITED  Strength:          GENERALIZED DECONDITIONING  Special Tests:           Positive Stemmer's sign (inability to pinch skin at base of second toe)   Skin Integrity:          Hyperpigmentation, mild lymphostatic fibrosis,   Sensation:  IMPAIRED  Functional Mobility:  IMPAIRED  Activities of Daily Living MODERATE ASSIST  Edema/Girth:  2+ and pitting   PRETREATMENT AFFECTED LIMB(s): lower extremity      Date:  10.21.20         Right / Left           Groin   []      []           8 inches   []      []           4 inches   []      []         PoplitealSpace   [x]      [x] 53/50          8 inches   [x]      [x] 55/53          4 inches   [x]      [x] 44/41          Ankle   [x]      [x] 29/28          Instep   [x]      [x] 23/23.5        Measurements are taken in centimeters:  2.54 cm = 1 inch  Cumulative Circumferential Volumetric Graph Measurements  RIGHT  CM        LEFT .5 CM               Physical Skills Involved:  1. Activity Tolerance  2. Pain (Chronic)  3. Edema  4. Skin Integrity Cognitive Skills Affected (resulting in the inability to perform in a timely and safe manner):  1. N/A Psychosocial Skills Affected:  1. Habits/Routines   Number of elements that affect the Plan of Care: 3-5:  MODERATE COMPLEXITY   CLINICAL DECISION MAKING:   Outcome Measure: Tool Used: Lymphedema Life Impact Scale   Score:  Initial: 63 Most Recent: X (Date: -- )   Interpretation of Score: The Lymphedema Life Impact Scale (LLIS) is a validated instrument that measures the physical, functional, and psychosocial concerns pertinent to patients with extremity lymphedema.   The Scale's questionnaire is administered to patients to gauge impairments, activity limitations, and participation restrictions resulting from their lymphedema. Score 0 1-13 14-26 27-40 41-54 55-67 68   Modifier CH CI CJ CK CL CM CN     ? Other PT/OT Primary Functional Limitations:     - CURRENT STATUS: CM - 80%-99% impaired, limited or restricted    - GOAL STATUS: CL - 60%-79% impaired, limited or restricted    - D/C STATUS:  ---------------To be determined---------------       Medical Necessity:   · Patient is expected to demonstrate progress in [address] [facility]. Reason for Services/Other Comments:  · Patient continues to require skilled intervention due to PT [address]. Use of outcome tool(s) and clinical judgement create a POC that gives a: Questionable prediction of patient's progress: MODERATE COMPLEXITY   TREATMENT:   (In addition to Assessment/Re-Assessment sessions the following treatments were rendered)    Pre-treatment Symptoms/Complaints:  PT'S GOALS ARE TO 1.) REDUCE SWELLING; 2.) REDUCE PAIN ASSOCIATED WITH SWELLING; 3.) IMPROVE BALANCE  Pain: Initial:   Pain Intensity 1: 6  Post Session:  6   Occupational Therapy Treatments:    OT eval( x  ) OT eval was completed. Pt received information on lymphedema and risk reduction/self management practices as outlined by the National Lymphedema Network. Therapeutic Exercise ( minutes):     HEP:  As above; handouts given to patient for all exercises. Manual Therapy:( 30 minutes)   Pt was educated on lymphatic pathophysiology, complete decongestive therapy, precautions and skin integrity management.             Manual Lymph Drainage:          Lymph Nodes:    Cervical Supraclavicular Axillary Abdominal Inguinal Popliteal Antecubital   RIGHT []     []     []     []     []     []     []       LEFT []     []     []     []     []     []     []          Anastamoses:   Axillo-axillary Inguino-inguinal Axillo-inguinal Inguino-axillary   ANTERIOR []     []     []     [] POSTERIOR []     []     []     []       RIGHT []     []     []     []       LEFT []     []     []     []         Limbs:   []    RUE     []    LUE     []    RLE    []    LLE   Compression:     Treatment/Session Assessment:    · Response to Treatment:  PT IN AGREEMENT WITH POC AND GOALS  · Compliance with Program/Exercises: Will assess as treatment progresses. · Recommendations/Intent for next treatment session: Next visit will focus on phase 1 complete decongestive therapy.   Total Treatment Duration:  60 MIN  OT Patient Time In/Time Out  Time In: 1200  Time Out: 0100    ROSALINDA Jean/VALERIANO,CLT

## 2020-10-27 ENCOUNTER — HOSPITAL ENCOUNTER (OUTPATIENT)
Dept: PHYSICAL THERAPY | Age: 55
Discharge: HOME | End: 2020-10-27
Attending: INTERNAL MEDICINE
Payer: MEDICARE

## 2020-10-27 NOTE — PROGRESS NOTES
Clinton Patel  : 1965  Primary: [employer] [employer]ios Medicare [place]  [place] [facility] Therapy  [address], [address]  [phone]   [phone]        OUTPATIENT DAILY NOTE    NAME/AGE/GENDER: Clinton Patel is a 63 y.o. female. DATE: 10/27/2020    Ms. Troy Remberto cancelled due to transportation problems. Will resume next scheduled treatment session.     Delfina Smoker, OTR/L, CLT

## 2020-11-04 ENCOUNTER — HOSPITAL ENCOUNTER (OUTPATIENT)
Dept: PHYSICAL THERAPY | Age: 55
Discharge: HOME | End: 2020-11-04
Attending: INTERNAL MEDICINE
Payer: MEDICARE

## 2020-11-04 PROCEDURE — 97140 MANUAL THERAPY 1/> REGIONS: CPT

## 2020-11-04 NOTE — PROGRESS NOTES
Mag Thomas  : 1965  Primary: [employer] [place]  Secondary: [place] Of [facility] Therapy  [address], [address]  [phone]   [phone]         OUTPATIENT OCCUPATIONAL THERAPY: Daily Note 2020    ICD-10: Treatment Diagnosis: I89.0, Lymphedema not elsewhere classified  I89.022, Lymphedema with obesity  I89.026, Lymphedema with diabetes  Precautions/Allergies:   Gabapentin; Lyrica [pregabalin]; Niacin; Nystatin; and Percocet [oxycodone-acetaminophen]   MD Orders: eval and treat MEDICAL/REFERRING DIAGNOSIS:   Edema, unspecified [R60.9]  Localized edema [R60.0]   DATE OF ONSET: Chronic   REFERRING PHYSICIAN: Lois Gill*  RETURN PHYSICIAN APPOINTMENT: to be determined      INITIAL ASSESSMENT:  Ms. Franc Mi presents with leg swelling that has been progressively getting worse \"for years. \"  Ms. Franc Mi presents with bilateral LE 2+ pitting edema, hyperpigmentation, mild lymphostatic fibrosis, positive Stemmer sign. She report her pain stays around 6/10 but shoots up to 10/10 with sitting too long. She is on disability for neck and back problems, since . She lives with her daughters and requires moderate assistance to complete ADLs and IADLs. She receives CLTC services in the home. She arrived in a wheelchair due to severe shortness of breath and difficulty walking back to the treatment area. She recently had a cardiac work up and reports her cardiac status is good and the swelling in her legs is not related to her heart. Ms. Franc Mi spends most of the day in bed. She is diabetic as well. Ms. Franc Mi will benefit from complete decongestive therapy, light exercise and compression bandaging to reduce leg swelling. Plan to transition to compression garments to manage reduction achieved in therapy. Ms. Franc Mi is in agreement with POC and goals.    PLAN OF CARE:   PROBLEM LIST:  1. Increased Pain  2. Decreased Activity Tolerance  3. Edema/Girth  4. Decreased Knowledge of Precautions  5. Decreased Skin Integrity/Hygeine  6. Decreased [place] with Home Exercise Program INTERVENTIONS PLANNED  1. Skin care  2. Compression bandaging  3. Fitting for compression garment(s)  4. Manual therapy/Manual lymph drainage  5. Therapeutic exercise/Therapeutic activities  6. Patient Education  7. Compression pump trial prn  8.  kinesiotaping    TREATMENT PLAN:  Effective Dates: 10/21/2020 TO 1/19/2021 (90 days). Frequency/Duration: 2 times a week for 90 Day(s)  Will adjust frequency and duration as progress indicates. GOALS: (Goals have been discussed and agreed upon with patient.)  Short-Term Functional Goals: Time Frame: 45 days  1. The patient/caregiver will verbalize understanding of lymphedema precautions. 2. Patient will be independent with skin care regimen to decrease risk of cellulitis. 3. The patient/caregiver will be independent at donning and doffing  lower extremity compression bandages. 4. The patient/caregiver will be independent with self-manual lymph drainage techniques and show decrease in limb volume. 5. Patient will be independent in lymphatic exercises. Discharge Goals: Time Frame: 90 days  1. Patient's bilateral lower extremity circumferential measurements will decrease 5 cm or greater on volumetric graph to maximize functional use in ADL's.    2. The patient/caregiver will be independent with home management of lymphedema. 3. Patient/caregiver will be independent donning and doffing bilateral lower extremity compression garment. Rehabilitation Potential For Stated Goals: Guarded              The information in this section was collected on 11/4/2020   (except where otherwise noted).   OCCUPATIONAL PROFILE & HISTORY:   History of Present Injury/Illness (Reason for Referral):  Ms. Barbara Saucedo presents with leg swelling that has been progressively getting worse \"for years. \"  Ms. Nura Garduno presents with bilateral LE 2+ pitting edema, hyperpigmentation, mild lymphostatic fibrosis, positive Stemmer sign. She report her pain stays around 6/10 but shoots up to 10/10 with sitting too long. She is on disability for neck and back problems, since . She lives with her daughters and requires moderate assistance to complete ADLs and IADLs. She receives CLTC services in the home. She arrived in a wheelchair due to severe shortness of breath and difficulty walking back to the treatment area. She recently had a cardiac work up and reports her cardiac status is good and the swelling in her legs is not related to her heart. Ms. Nura Garduno spends most of the day in bed. She is diabetic as well. Ms. Nura Garduno will benefit from complete decongestive therapy, light exercise and compression bandaging to reduce leg swelling. Plan to transition to compression garments to manage reduction achieved in therapy. Ms. Nura Garduno is in agreement with POC and goals. Past Medical History/Comorbidities:   Ms. Nura Garduno  has a past medical history of Asthma, CAD (coronary artery disease), Chronic pain, Diabetes ([address] Hypertension, Ill-defined condition, Morbid obesity ([address] and Thromboembolus ([address] (2019). Ms. Nura Garduno  has a past surgical history that includes hx  section (); hx orthopaedic (Right, ); hx gi; hx hernia repair; hx cervical fusion (); hx heart catheterization; hx cholecystectomy; and colonoscopy (N/A, 2019).   Social History/Living Environment:    lives with daughters; oldest daughter provides CLTC home assistance   Prior Level of Function/Work/Activity:  Moderate assistance/on disability/sedentary  Dominant Side:         RIGHT  Previous Treatment Approaches:          No treatment for LE lymphedema   Ambulatory/Rehab Services H2 Model Falls Risk Assessment    Risk Factors:       No Risk Factors Identified Ability to Rise from Chair:       (4) Unable to rise without assistance    Falls Prevention Plan:       Physical Limitations to Exercise (specify):  impaired mobility   Total: (5 or greater = High Risk): 4    ©2010 [facility] [employer]. All Rights Reserved. [place] Patent #[id number]. Federal Law prohibits the replication, distribution or use without written permission from [facility] [employer]     Current Medications:    Current Outpatient Medications:     Levemir FlexTouch U-100 Insuln 100 unit/mL (3 mL) inpn, 48 Units by SubCUTAneous route daily. , Disp: , Rfl:     glucose blood VI test strips (Accu-Chek Drea Plus test strp) strip, Use to check blood sugar 3x daily and PRN, Disp: , Rfl:     NovoLOG Flexpen U-100 Insulin 100 unit/mL (3 mL) inpn, Use with correction scale 2/50>150, max daily dose 30 units, Disp: 9 Pen, Rfl: 3    Ozempic 0.25 mg or 0.5 mg(2 mg/1.5 mL) sub-q pen, 0.5 mg by SubCUTAneous route every seven (7) days. , Disp: 3 Box, Rfl: 3    canagliflozin (Invokana) 300 mg tablet, Take 1 Tab by mouth Daily (before breakfast). , Disp: 90 Tab, Rfl: 3    metFORMIN ER (GLUCOPHAGE XR) 750 mg tablet, Take 1 Tab by mouth daily. , Disp: 90 Tab, Rfl: 3    torsemide (DEMADEX) 10 mg tablet, Take 1 Tab by mouth daily. , Disp: 30 Tab, Rfl: 3    glimepiride (AMARYL) 4 mg tablet, Take 1 Tab by mouth every morning., Disp: 90 Tab, Rfl: 3    potassium chloride (KLOR-CON) 10 mEq tablet, Take 1 Tab by mouth daily for 30 days. , Disp: 30 Tab, Rfl: 3    mirabegron ER (MYRBETRIQ) 25 mg ER tablet, Take 25 mg by mouth daily. , Disp: , Rfl:     pantoprazole (PROTONIX) 40 mg tablet, Take 1 Tab by mouth daily for 90 days. , Disp: 30 Tab, Rfl: 2    hydrALAZINE (APRESOLINE) 50 mg tablet, Take 1 Tab by mouth two (2) times a day., Disp: 60 Tab, Rfl: 3    irbesartan 300 mg tab 300 mg, hydroCHLOROthiazide 12.5 mg cap 12.5 mg, Take  by mouth daily. , Disp: , Rfl:     nebivolol (BYSTOLIC) 10 mg tablet, Take 10 mg by mouth daily. , Disp: , Rfl:             Date Last Reviewed:  11/4/2020     Complexity Level : Expanded review of therapy/medical records (1-2):  MODERATE COMPLEXITY   ASSESSMENT OF OCCUPATIONAL PERFORMANCE:   Palpation:          2+ PITTING EDEMA BILATERALLY  ROM:          LIMITED  Strength:          GENERALIZED DECONDITIONING  Special Tests:           Positive Stemmer's sign (inability to pinch skin at base of second toe)   Skin Integrity:          Hyperpigmentation, mild lymphostatic fibrosis,   Sensation:  IMPAIRED  Functional Mobility:  IMPAIRED  Activities of Daily Living MODERATE ASSIST  Edema/Girth:  2+ and pitting   PRETREATMENT AFFECTED LIMB(s): lower extremity      Date:  10.21.20         Right / Left           Groin   []      []           8 inches   []      []           4 inches   []      []         PoplitealSpace   [x]      [x] 53/50          8 inches   [x]      [x] 55/53          4 inches   [x]      [x] 44/41          Ankle   [x]      [x] 29/28          Instep   [x]      [x] 23/23.5        Measurements are taken in centimeters:  2.54 cm = 1 inch  Cumulative Circumferential Volumetric Graph Measurements  RIGHT  CM        LEFT .5 CM               Physical Skills Involved:  1. Activity Tolerance  2. Pain (Chronic)  3. Edema  4. Skin Integrity Cognitive Skills Affected (resulting in the inability to perform in a timely and safe manner):  1. N/A Psychosocial Skills Affected:  1. Habits/Routines   Number of elements that affect the Plan of Care: 3-5:  MODERATE COMPLEXITY   CLINICAL DECISION MAKING:   Outcome Measure: Tool Used: Lymphedema Life Impact Scale   Score:  Initial: 63 Most Recent: X (Date: -- )   Interpretation of Score: The Lymphedema Life Impact Scale (LLIS) is a validated instrument that measures the physical, functional, and psychosocial concerns pertinent to patients with extremity lymphedema.   The Scale's questionnaire is administered to patients to gauge impairments, activity limitations, and participation restrictions resulting from their lymphedema. Score 0 1-13 14-26 27-40 41-54 55-67 68   Modifier CH CI CJ CK CL CM CN     ? Other PT/OT Primary Functional Limitations:     - CURRENT STATUS: CM - 80%-99% impaired, limited or restricted    - GOAL STATUS: CL - 60%-79% impaired, limited or restricted    - D/C STATUS:  ---------------To be determined---------------       Medical Necessity:   · Patient is expected to demonstrate progress in [address] to [address] [facility]. Reason for Services/Other Comments:  · Patient continues to require skilled intervention due to PT [address]. Use of outcome tool(s) and clinical judgement create a POC that gives a: Questionable prediction of patient's progress: MODERATE COMPLEXITY   TREATMENT:   (In addition to Assessment/Re-Assessment sessions the following treatments were rendered)    Pre-treatment Symptoms/Complaints:  Pt here for initial MLD session; her daughter Astrid Liang, is here to learn to bandage pt at home  Pain: Initial:   Pain Intensity 1: 6  Post Session:  6   Occupational Therapy Treatments:    Therapeutic Exercise ( minutes):     HEP:  As above; handouts given to patient for all exercises.   Manual Therapy:( 60 minutes)  Manual lymphatic drainage, application of Eucerin lotion, compression pump trial, multilayer compression bandaging          Manual Lymph Drainage:          Lymph Nodes:    Cervical Supraclavicular Axillary Abdominal Inguinal Popliteal Antecubital   RIGHT [x]     [x]     [x]     []     [x]     [x]     []       LEFT [x]     [x]     [x]     []     [x]     [x]     []          Anastamoses:   Axillo-axillary Inguino-inguinal Axillo-inguinal Inguino-axillary   ANTERIOR []     []     []     [x]       POSTERIOR []     []     []     []       RIGHT []     []     []     [x]       LEFT []     []     []     [x]         Limbs:   []    RUE     []    LUE [x]    RLE    [x]    LLE   Compression:  B LE multilayer compression bandaging using stockinette and 3 short stretch bandages on each leg. Instructed daughter in bandaging with short stretch bandages. Pt will try and leave on for several days; remove if having pain or increased shortness of breath. Treatment/Session Assessment:    · Response to Treatment:  Pt tolerated treatment without medical complication. · Compliance with Program/Exercises: Will assess as treatment progresses. · Recommendations/Intent for next treatment session: Next visit will focus on phase 1 complete decongestive therapy.   Total Treatment Duration:  60 MIN  OT Patient Time In/Time Out  Time In: 0915  Time Out: [address], OTR/L,CLT

## 2020-11-09 ENCOUNTER — HOSPITAL ENCOUNTER (OUTPATIENT)
Dept: PHYSICAL THERAPY | Age: 55
Discharge: HOME | End: 2020-11-09
Attending: INTERNAL MEDICINE
Payer: MEDICARE

## 2020-11-09 PROCEDURE — 97140 MANUAL THERAPY 1/> REGIONS: CPT

## 2020-11-09 NOTE — PROGRESS NOTES
David Andre  : 1965  Primary: Kamryn Holguin [facility] Medicare Hmo  Secondary: [place] [facility]  [address], [facility], [address]  [phone]   [phone]         OUTPATIENT OCCUPATIONAL THERAPY: Daily Note 2020    ICD-10: Treatment Diagnosis: I89.0, Lymphedema not elsewhere classified  I89.022, Lymphedema with obesity  I89.026, Lymphedema with diabetes  Precautions/Allergies:   Gabapentin; Lyrica [pregabalin]; Niacin; Nystatin; and Percocet [oxycodone-acetaminophen]   MD Orders: eval and treat MEDICAL/REFERRING DIAGNOSIS:   Edema, unspecified [R60.9]  Localized edema [R60.0]   DATE OF ONSET: Chronic   REFERRING PHYSICIAN: Ni Palomo*  RETURN PHYSICIAN APPOINTMENT: to be determined      INITIAL ASSESSMENT:  Ms. Marilynne Lesches presents with leg swelling that has been progressively getting worse \"for years. \"  Ms. Marilynne Lesches presents with bilateral LE 2+ pitting edema, hyperpigmentation, mild lymphostatic fibrosis, positive Stemmer sign. She report her pain stays around 6/10 but shoots up to 10/10 with sitting too long. She is on disability for neck and back problems, since . She lives with her daughters and requires moderate assistance to complete ADLs and IADLs. She receives CLTC services in the home. She arrived in a wheelchair due to severe shortness of breath and difficulty walking back to the treatment area. She recently had a cardiac work up and reports her cardiac status is good and the swelling in her legs is not related to her heart. Ms. Marilynne Lesches spends most of the day in bed. She is diabetic as well. Ms. Marilynne Lesches will benefit from complete decongestive therapy, light exercise and compression bandaging to reduce leg swelling. Plan to transition to compression garments to manage reduction achieved in therapy. Ms. Marilynne Lesches is in agreement with POC and goals.    PLAN OF CARE:   PROBLEM LIST:  1. Increased Pain  2. Decreased Activity Tolerance  3. Edema/Girth  4. Decreased Knowledge of Precautions  5. Decreased Skin Integrity/Hygeine  6. Decreased [place] with Home Exercise Program INTERVENTIONS PLANNED  1. Skin care  2. Compression bandaging  3. Fitting for compression garment(s)  4. Manual therapy/Manual lymph drainage  5. Therapeutic exercise/Therapeutic activities  6. Patient Education  7. Compression pump trial prn  8.  kinesiotaping    TREATMENT PLAN:  Effective Dates: 10/21/2020 TO 1/19/2021 (90 days). Frequency/Duration: 2 times a week for 90 Day(s)  Will adjust frequency and duration as progress indicates. GOALS: (Goals have been discussed and agreed upon with patient.)  Short-Term Functional Goals: Time Frame: 45 days  1. The patient/caregiver will verbalize understanding of lymphedema precautions. 2. Patient will be independent with skin care regimen to decrease risk of cellulitis. 3. The patient/caregiver will be independent at donning and doffing  lower extremity compression bandages. 4. The patient/caregiver will be independent with self-manual lymph drainage techniques and show decrease in limb volume. 5. Patient will be independent in lymphatic exercises. Discharge Goals: Time Frame: 90 days  1. Patient's bilateral lower extremity circumferential measurements will decrease 5 cm or greater on volumetric graph to maximize functional use in ADL's.    2. The patient/caregiver will be independent with home management of lymphedema. 3. Patient/caregiver will be independent donning and doffing bilateral lower extremity compression garment. Rehabilitation Potential For Stated Goals: Guarded              The information in this section was collected on 11/9/2020   (except where otherwise noted).   OCCUPATIONAL PROFILE & HISTORY:   History of Present Injury/Illness (Reason for Referral):  Ms. Kiesha Alcantar presents with leg swelling that has been progressively getting worse \"for years. \"  Ms. Ronn Land presents with bilateral LE 2+ pitting edema, hyperpigmentation, mild lymphostatic fibrosis, positive Stemmer sign. She report her pain stays around 6/10 but shoots up to 10/10 with sitting too long. She is on disability for neck and back problems, since . She lives with her daughters and requires moderate assistance to complete ADLs and IADLs. She receives CLTC services in the home. She arrived in a wheelchair due to severe shortness of breath and difficulty walking back to the treatment area. She recently had a cardiac work up and reports her cardiac status is good and the swelling in her legs is not related to her heart. Ms. Ronn Land spends most of the day in bed. She is diabetic as well. Ms. Ronn Land will benefit from complete decongestive therapy, light exercise and compression bandaging to reduce leg swelling. Plan to transition to compression garments to manage reduction achieved in therapy. Ms. Ronn Land is in agreement with POC and goals. Past Medical History/Comorbidities:   Ms. Ronn Land  has a past medical history of Asthma, CAD (coronary artery disease), Chronic pain, Diabetes ([address] Hypertension, Ill-defined condition, Morbid obesity ([address] and Thromboembolus ([address] (2019). Ms. Ronn Land  has a past surgical history that includes hx  section (); hx orthopaedic (Right, ); hx gi; hx hernia repair; hx cervical fusion (); hx heart catheterization; hx cholecystectomy; and colonoscopy (N/A, 2019).   Social History/Living Environment:    lives with daughters; oldest daughter provides CLTC home assistance   Prior Level of Function/Work/Activity:  Moderate assistance/on disability/sedentary  Dominant Side:         RIGHT  Previous Treatment Approaches:          No treatment for LE lymphedema   Ambulatory/Rehab Services H2 Model Falls Risk Assessment    Risk Factors:       No Risk Factors Identified Ability to Rise from Chair:       (4) Unable to rise without assistance    Falls Prevention Plan:       Physical Limitations to Exercise (specify):  impaired mobility   Total: (5 or greater = High Risk): 4    ©2010 [facility] of [employer]. All Rights Reserved. [place] States Patent #3,285,368. Federal Law prohibits the replication, distribution or use without written permission from [facility] [employer]     Current Medications:    Current Outpatient Medications:     atorvastatin (LIPITOR) 40 mg tablet, Take 1 Tab by mouth daily. , Disp: 30 Tab, Rfl: 6    ergocalciferol (ERGOCALCIFEROL) 1,250 mcg (50,000 unit) capsule, Take one tablet twice a week for 6 weeks, Disp: 12 Cap, Rfl: 0    OTHER, One hospital bed, Disp: 1 Units, Rfl: 0    Levemir FlexTouch U-100 Insuln 100 unit/mL (3 mL) inpn, 48 Units by SubCUTAneous route daily. , Disp: , Rfl:     glucose blood VI test strips (Accu-Chek Drea Plus test strp) strip, Use to check blood sugar 3x daily and PRN, Disp: , Rfl:     NovoLOG Flexpen U-100 Insulin 100 unit/mL (3 mL) inpn, Use with correction scale 2/50>150, max daily dose 30 units, Disp: 9 Pen, Rfl: 3    Ozempic 0.25 mg or 0.5 mg(2 mg/1.5 mL) sub-q pen, 0.5 mg by SubCUTAneous route every seven (7) days. , Disp: 3 Box, Rfl: 3    canagliflozin (Invokana) 300 mg tablet, Take 1 Tab by mouth Daily (before breakfast). , Disp: 90 Tab, Rfl: 3    metFORMIN ER (GLUCOPHAGE XR) 750 mg tablet, Take 1 Tab by mouth daily. , Disp: 90 Tab, Rfl: 3    torsemide (DEMADEX) 10 mg tablet, Take 1 Tab by mouth daily. , Disp: 30 Tab, Rfl: 3    glimepiride (AMARYL) 4 mg tablet, Take 1 Tab by mouth every morning., Disp: 90 Tab, Rfl: 3    mirabegron ER (MYRBETRIQ) 25 mg ER tablet, Take 25 mg by mouth daily. , Disp: , Rfl:     pantoprazole (PROTONIX) 40 mg tablet, Take 1 Tab by mouth daily for 90 days. , Disp: 30 Tab, Rfl: 2    hydrALAZINE (APRESOLINE) 50 mg tablet, Take 1 Tab by mouth two (2) times a day., Disp: 60 Tab, Rfl: 3    irbesartan 300 mg tab 300 mg, hydroCHLOROthiazide 12.5 mg cap 12.5 mg, Take  by mouth daily. , Disp: , Rfl:     nebivolol (BYSTOLIC) 10 mg tablet, Take 10 mg by mouth daily. , Disp: , Rfl:             Date Last Reviewed:  11/9/2020     Complexity Level : Expanded review of therapy/medical records (1-2):  MODERATE COMPLEXITY   ASSESSMENT OF OCCUPATIONAL PERFORMANCE:   Palpation:          2+ PITTING EDEMA BILATERALLY  ROM:          LIMITED  Strength:          GENERALIZED DECONDITIONING  Special Tests:           Positive Stemmer's sign (inability to pinch skin at base of second toe)   Skin Integrity:          Hyperpigmentation, mild lymphostatic fibrosis,   Sensation:  IMPAIRED  Functional Mobility:  IMPAIRED  Activities of Daily Living MODERATE ASSIST  Edema/Girth:  2+ and pitting   PRETREATMENT AFFECTED LIMB(s): lower extremity      Date:  10.21.20         Right / Left           Groin   []      []           8 inches   []      []           4 inches   []      []         PoplitealSpace   [x]      [x] 53/50          8 inches   [x]      [x] 55/53          4 inches   [x]      [x] 44/41          Ankle   [x]      [x] 29/28          Instep   [x]      [x] 23/23.5        Measurements are taken in centimeters:  2.54 cm = 1 inch  Cumulative Circumferential Volumetric Graph Measurements  RIGHT  CM        LEFT .5 CM               Physical Skills Involved:  1. Activity Tolerance  2. Pain (Chronic)  3. Edema  4. Skin Integrity Cognitive Skills Affected (resulting in the inability to perform in a timely and safe manner):  1. N/A Psychosocial Skills Affected:  1. Habits/Routines   Number of elements that affect the Plan of Care: 3-5:  MODERATE COMPLEXITY   CLINICAL DECISION MAKING:   Outcome Measure: Tool Used: Lymphedema Life Impact Scale   Score:  Initial: 63 Most Recent: X (Date: -- )   Interpretation of Score:  The Lymphedema Life Impact Scale (LLIS) is a validated instrument that measures the physical, functional, and psychosocial concerns pertinent to patients with extremity lymphedema. The Scale's questionnaire is administered to patients to gauge impairments, activity limitations, and participation restrictions resulting from their lymphedema. Score 0 1-13 14-26 27-40 41-54 55-67 68   Modifier CH CI CJ CK CL CM CN     ? Other PT/OT Primary Functional Limitations:     - CURRENT STATUS: CM - 80%-99% impaired, limited or restricted    - GOAL STATUS: CL - 60%-79% impaired, limited or restricted    - D/C STATUS:  ---------------To be determined---------------       Medical Necessity:   · Patient is expected to demonstrate progress in [address] [facility] to [address] [facility]. Reason for Services/Other Comments:  · Patient continues to require skilled intervention due to PT [address]. Use of outcome tool(s) and clinical judgement create a POC that gives a: Questionable prediction of patient's progress: MODERATE COMPLEXITY   TREATMENT:   (In addition to Assessment/Re-Assessment sessions the following treatments were rendered)    Pre-treatment Symptoms/Complaints:  Pt reports she wore bandages for three days; removed to shower and daughter rebandaged her. Pain: Initial:   Pain Intensity 1: 6  Post Session:  6   Occupational Therapy Treatments:    Therapeutic Exercise ( minutes):     HEP:  As above; handouts given to patient for all exercises.   Manual Therapy:( 60 minutes)  Manual lymphatic drainage, application of Eucerin lotion,f multilayer compression bandaging          Manual Lymph Drainage:          Lymph Nodes:    Cervical Supraclavicular Axillary Abdominal Inguinal Popliteal Antecubital   RIGHT [x]     [x]     [x]     []     [x]     [x]     []       LEFT [x]     [x]     [x]     []     [x]     [x]     []          Anastamoses:   Axillo-axillary Inguino-inguinal Axillo-inguinal Inguino-axillary   ANTERIOR []     []     []     [x] POSTERIOR []     []     []     []       RIGHT []     []     []     [x]       LEFT []     []     []     [x]         Limbs:   []    RUE     []    LUE     [x]    RLE    [x]    LLE   Compression:  B LE multilayer compression bandaging using stockinette and 3 short stretch bandages on each leg. Pt will try and leave on until next treatment session day; remove if having pain or increased shortness of breath. Treatment/Session Assessment:    · Response to Treatment:  Pt tolerated treatment without medical complication. · Compliance with Program/Exercises: Compliant  · Recommendations/Intent for next treatment session: Next visit will focus on phase 1 complete decongestive therapy.   Total Treatment Duration:  60 MIN  OT Patient Time In/Time Out  Time In: 1000  [facility]

## 2020-11-17 ENCOUNTER — APPOINTMENT (OUTPATIENT)
Dept: GENERAL RADIOLOGY | Age: 55
End: 2020-11-17
Attending: EMERGENCY MEDICINE
Payer: MEDICARE

## 2020-11-17 ENCOUNTER — APPOINTMENT (OUTPATIENT)
Dept: CT IMAGING | Age: 55
End: 2020-11-17
Attending: EMERGENCY MEDICINE
Payer: MEDICARE

## 2020-11-17 ENCOUNTER — HOSPITAL ENCOUNTER (OUTPATIENT)
Age: 55
Setting detail: OBSERVATION
Discharge: HOME | End: 2020-11-18
Attending: EMERGENCY MEDICINE | Admitting: FAMILY MEDICINE
Payer: MEDICARE

## 2020-11-17 DIAGNOSIS — E11.21 TYPE 2 DIABETES WITH NEPHROPATHY (HCC): ICD-10-CM

## 2020-11-17 DIAGNOSIS — I82.509 CHRONIC DEEP VEIN THROMBOSIS (DVT) OF LOWER EXTREMITY, UNSPECIFIED LATERALITY, UNSPECIFIED VEIN (HCC): ICD-10-CM

## 2020-11-17 DIAGNOSIS — R03.0 ELEVATED BLOOD PRESSURE READING: ICD-10-CM

## 2020-11-17 DIAGNOSIS — R06.00 ACUTE DYSPNEA: Primary | ICD-10-CM

## 2020-11-17 DIAGNOSIS — R00.0 TACHYCARDIA: ICD-10-CM

## 2020-11-17 DIAGNOSIS — E66.01 MORBID OBESITY (HCC): ICD-10-CM

## 2020-11-17 LAB
ALBUMIN SERPL-MCNC: 3.1 G/DL (ref 3.5–5)
ALBUMIN/GLOB SERPL: 0.7 {RATIO} (ref 1.2–3.5)
ALP SERPL-CCNC: 150 U/L (ref 50–136)
ALT SERPL-CCNC: 19 U/L (ref 12–65)
ANION GAP SERPL CALC-SCNC: 8 MMOL/L (ref 7–16)
AST SERPL-CCNC: 10 U/L (ref 15–37)
ATRIAL RATE: 77 BPM
BASOPHILS # BLD: 0.1 K/UL (ref 0–0.2)
BASOPHILS NFR BLD: 1 % (ref 0–2)
BILIRUB SERPL-MCNC: 1.1 MG/DL (ref 0.2–1.1)
BNP SERPL-MCNC: 375 PG/ML (ref 5–125)
BUN SERPL-MCNC: 19 MG/DL (ref 6–23)
CALCIUM SERPL-MCNC: 8.8 MG/DL (ref 8.3–10.4)
CALCULATED P AXIS, ECG09: 54 DEGREES
CALCULATED R AXIS, ECG10: 43 DEGREES
CALCULATED T AXIS, ECG11: 35 DEGREES
CHLORIDE SERPL-SCNC: 107 MMOL/L (ref 98–107)
CO2 SERPL-SCNC: 27 MMOL/L (ref 21–32)
CREAT SERPL-MCNC: 0.94 MG/DL (ref 0.6–1)
DIAGNOSIS, 93000: NORMAL
DIFFERENTIAL METHOD BLD: ABNORMAL
EOSINOPHIL # BLD: 0.1 K/UL (ref 0–0.8)
EOSINOPHIL NFR BLD: 1 % (ref 0.5–7.8)
ERYTHROCYTE [DISTWIDTH] IN BLOOD BY AUTOMATED COUNT: 14.3 % (ref 11.9–14.6)
GLOBULIN SER CALC-MCNC: 4.7 G/DL (ref 2.3–3.5)
GLUCOSE SERPL-MCNC: 139 MG/DL (ref 65–100)
HCT VFR BLD AUTO: 40.7 % (ref 35.8–46.3)
HGB BLD-MCNC: 12.5 G/DL (ref 11.7–15.4)
IMM GRANULOCYTES # BLD AUTO: 0.1 K/UL (ref 0–0.5)
IMM GRANULOCYTES NFR BLD AUTO: 1 % (ref 0–5)
LYMPHOCYTES # BLD: 2.6 K/UL (ref 0.5–4.6)
LYMPHOCYTES NFR BLD: 24 % (ref 13–44)
MCH RBC QN AUTO: 29.2 PG (ref 26.1–32.9)
MCHC RBC AUTO-ENTMCNC: 30.7 G/DL (ref 31.4–35)
MCV RBC AUTO: 95.1 FL (ref 79.6–97.8)
MONOCYTES # BLD: 0.6 K/UL (ref 0.1–1.3)
MONOCYTES NFR BLD: 5 % (ref 4–12)
NEUTS SEG # BLD: 7.6 K/UL (ref 1.7–8.2)
NEUTS SEG NFR BLD: 69 % (ref 43–78)
NRBC # BLD: 0 K/UL (ref 0–0.2)
P-R INTERVAL, ECG05: 142 MS
PLATELET # BLD AUTO: 343 K/UL (ref 150–450)
PMV BLD AUTO: 10.4 FL (ref 9.4–12.3)
POTASSIUM SERPL-SCNC: 3.9 MMOL/L (ref 3.5–5.1)
PROT SERPL-MCNC: 7.8 G/DL (ref 6.3–8.2)
Q-T INTERVAL, ECG07: 400 MS
QRS DURATION, ECG06: 94 MS
QTC CALCULATION (BEZET), ECG08: 452 MS
RBC # BLD AUTO: 4.28 M/UL (ref 4.05–5.2)
SODIUM SERPL-SCNC: 142 MMOL/L (ref 136–145)
TROPONIN-HIGH SENSITIVITY: 12.9 PG/ML (ref 0–14)
TROPONIN-HIGH SENSITIVITY: 14.9 PG/ML (ref 0–14)
VENTRICULAR RATE, ECG03: 77 BPM
WBC # BLD AUTO: 11 K/UL (ref 4.3–11.1)

## 2020-11-17 PROCEDURE — 94640 AIRWAY INHALATION TREATMENT: CPT

## 2020-11-17 PROCEDURE — 96374 THER/PROPH/DIAG INJ IV PUSH: CPT

## 2020-11-17 PROCEDURE — 99284 EMERGENCY DEPT VISIT MOD MDM: CPT

## 2020-11-17 PROCEDURE — 71045 X-RAY EXAM CHEST 1 VIEW: CPT

## 2020-11-17 PROCEDURE — 93005 ELECTROCARDIOGRAM TRACING: CPT | Performed by: EMERGENCY MEDICINE

## 2020-11-17 PROCEDURE — 74011000250 HC RX REV CODE- 250: Performed by: EMERGENCY MEDICINE

## 2020-11-17 PROCEDURE — 74011000636 HC RX REV CODE- 636: Performed by: EMERGENCY MEDICINE

## 2020-11-17 PROCEDURE — 96375 TX/PRO/DX INJ NEW DRUG ADDON: CPT

## 2020-11-17 PROCEDURE — 84484 ASSAY OF TROPONIN QUANT: CPT

## 2020-11-17 PROCEDURE — 94664 DEMO&/EVAL PT USE INHALER: CPT

## 2020-11-17 PROCEDURE — 74011000258 HC RX REV CODE- 258: Performed by: EMERGENCY MEDICINE

## 2020-11-17 PROCEDURE — 74011250636 HC RX REV CODE- 250/636: Performed by: EMERGENCY MEDICINE

## 2020-11-17 PROCEDURE — 80053 COMPREHEN METABOLIC PANEL: CPT

## 2020-11-17 PROCEDURE — 87635 SARS-COV-2 COVID-19 AMP PRB: CPT

## 2020-11-17 PROCEDURE — 85025 COMPLETE CBC W/AUTO DIFF WBC: CPT

## 2020-11-17 PROCEDURE — 74011250637 HC RX REV CODE- 250/637: Performed by: EMERGENCY MEDICINE

## 2020-11-17 PROCEDURE — 83880 ASSAY OF NATRIURETIC PEPTIDE: CPT

## 2020-11-17 PROCEDURE — 71260 CT THORAX DX C+: CPT

## 2020-11-17 RX ORDER — SODIUM CHLORIDE 0.9 % (FLUSH) 0.9 %
10 SYRINGE (ML) INJECTION
Status: COMPLETED | OUTPATIENT
Start: 2020-11-17 | End: 2020-11-17

## 2020-11-17 RX ORDER — ALBUTEROL SULFATE 0.83 MG/ML
10 SOLUTION RESPIRATORY (INHALATION) CONTINUOUS
Status: DISCONTINUED | OUTPATIENT
Start: 2020-11-17 | End: 2020-11-18 | Stop reason: HOSPADM

## 2020-11-17 RX ORDER — LABETALOL HYDROCHLORIDE 5 MG/ML
20 INJECTION, SOLUTION INTRAVENOUS
Status: COMPLETED | OUTPATIENT
Start: 2020-11-17 | End: 2020-11-17

## 2020-11-17 RX ORDER — HYDROCODONE BITARTRATE AND ACETAMINOPHEN 10; 325 MG/1; MG/1
1 TABLET ORAL
Status: COMPLETED | OUTPATIENT
Start: 2020-11-17 | End: 2020-11-17

## 2020-11-17 RX ORDER — ACETAMINOPHEN 650 MG/1
650 SUPPOSITORY RECTAL
Status: DISCONTINUED | OUTPATIENT
Start: 2020-11-17 | End: 2020-11-18 | Stop reason: HOSPADM

## 2020-11-17 RX ORDER — ENALAPRILAT 1.25 MG/ML
1.25 INJECTION INTRAVENOUS
Status: COMPLETED | OUTPATIENT
Start: 2020-11-17 | End: 2020-11-17

## 2020-11-17 RX ORDER — ENOXAPARIN SODIUM 100 MG/ML
40 INJECTION SUBCUTANEOUS EVERY 12 HOURS
Status: DISCONTINUED | OUTPATIENT
Start: 2020-11-18 | End: 2020-11-18 | Stop reason: HOSPADM

## 2020-11-17 RX ORDER — INSULIN GLARGINE 100 [IU]/ML
48 INJECTION, SOLUTION SUBCUTANEOUS DAILY
Status: DISCONTINUED | OUTPATIENT
Start: 2020-11-18 | End: 2020-11-18 | Stop reason: HOSPADM

## 2020-11-17 RX ORDER — SODIUM CHLORIDE 0.9 % (FLUSH) 0.9 %
5-40 SYRINGE (ML) INJECTION AS NEEDED
Status: DISCONTINUED | OUTPATIENT
Start: 2020-11-17 | End: 2020-11-18 | Stop reason: HOSPADM

## 2020-11-17 RX ORDER — NEBIVOLOL 10 MG/1
10 TABLET ORAL DAILY
Status: DISCONTINUED | OUTPATIENT
Start: 2020-11-18 | End: 2020-11-18 | Stop reason: HOSPADM

## 2020-11-17 RX ORDER — PROMETHAZINE HYDROCHLORIDE 25 MG/1
12.5 TABLET ORAL
Status: DISCONTINUED | OUTPATIENT
Start: 2020-11-17 | End: 2020-11-18 | Stop reason: HOSPADM

## 2020-11-17 RX ORDER — HYDRALAZINE HYDROCHLORIDE 25 MG/1
50 TABLET, FILM COATED ORAL 2 TIMES DAILY
Status: DISCONTINUED | OUTPATIENT
Start: 2020-11-18 | End: 2020-11-18 | Stop reason: HOSPADM

## 2020-11-17 RX ORDER — ONDANSETRON 2 MG/ML
4 INJECTION INTRAMUSCULAR; INTRAVENOUS
Status: DISCONTINUED | OUTPATIENT
Start: 2020-11-17 | End: 2020-11-18 | Stop reason: HOSPADM

## 2020-11-17 RX ORDER — TORSEMIDE 20 MG/1
10 TABLET ORAL DAILY
Status: DISCONTINUED | OUTPATIENT
Start: 2020-11-18 | End: 2020-11-18 | Stop reason: HOSPADM

## 2020-11-17 RX ORDER — GLIMEPIRIDE 4 MG/1
4 TABLET ORAL DAILY
Status: DISCONTINUED | OUTPATIENT
Start: 2020-11-18 | End: 2020-11-18 | Stop reason: HOSPADM

## 2020-11-17 RX ORDER — SODIUM CHLORIDE 0.9 % (FLUSH) 0.9 %
5-40 SYRINGE (ML) INJECTION EVERY 8 HOURS
Status: DISCONTINUED | OUTPATIENT
Start: 2020-11-17 | End: 2020-11-18 | Stop reason: HOSPADM

## 2020-11-17 RX ORDER — ACETAMINOPHEN 325 MG/1
650 TABLET ORAL
Status: DISCONTINUED | OUTPATIENT
Start: 2020-11-17 | End: 2020-11-18 | Stop reason: HOSPADM

## 2020-11-17 RX ORDER — METFORMIN HYDROCHLORIDE 500 MG/1
1000 TABLET ORAL 2 TIMES DAILY
Status: DISCONTINUED | OUTPATIENT
Start: 2020-11-18 | End: 2020-11-18 | Stop reason: HOSPADM

## 2020-11-17 RX ORDER — IPRATROPIUM BROMIDE AND ALBUTEROL SULFATE 2.5; .5 MG/3ML; MG/3ML
3 SOLUTION RESPIRATORY (INHALATION)
Status: COMPLETED | OUTPATIENT
Start: 2020-11-17 | End: 2020-11-17

## 2020-11-17 RX ORDER — CYCLOBENZAPRINE HCL 10 MG
10 TABLET ORAL
Status: COMPLETED | OUTPATIENT
Start: 2020-11-17 | End: 2020-11-17

## 2020-11-17 RX ORDER — SODIUM CHLORIDE 9 MG/ML
75 INJECTION, SOLUTION INTRAVENOUS CONTINUOUS
Status: DISCONTINUED | OUTPATIENT
Start: 2020-11-17 | End: 2020-11-18 | Stop reason: HOSPADM

## 2020-11-17 RX ORDER — FUROSEMIDE 10 MG/ML
40 INJECTION INTRAMUSCULAR; INTRAVENOUS
Status: COMPLETED | OUTPATIENT
Start: 2020-11-17 | End: 2020-11-17

## 2020-11-17 RX ORDER — ATORVASTATIN CALCIUM 40 MG/1
40 TABLET, FILM COATED ORAL DAILY
Status: DISCONTINUED | OUTPATIENT
Start: 2020-11-18 | End: 2020-11-18 | Stop reason: HOSPADM

## 2020-11-17 RX ORDER — POLYETHYLENE GLYCOL 3350 17 G/17G
17 POWDER, FOR SOLUTION ORAL DAILY PRN
Status: DISCONTINUED | OUTPATIENT
Start: 2020-11-17 | End: 2020-11-18 | Stop reason: HOSPADM

## 2020-11-17 RX ADMIN — HYDROCODONE BITARTRATE AND ACETAMINOPHEN 1 TABLET: 10; 325 TABLET ORAL at 22:12

## 2020-11-17 RX ADMIN — LABETALOL HYDROCHLORIDE 20 MG: 5 INJECTION INTRAVENOUS at 22:12

## 2020-11-17 RX ADMIN — ENALAPRILAT 1.25 MG: 1.25 INJECTION INTRAVENOUS at 19:02

## 2020-11-17 RX ADMIN — FUROSEMIDE 40 MG: 10 INJECTION, SOLUTION INTRAMUSCULAR; INTRAVENOUS at 17:51

## 2020-11-17 RX ADMIN — SODIUM CHLORIDE 100 ML: 900 INJECTION, SOLUTION INTRAVENOUS at 19:50

## 2020-11-17 RX ADMIN — IOPAMIDOL 100 ML: 755 INJECTION, SOLUTION INTRAVENOUS at 19:50

## 2020-11-17 RX ADMIN — CYCLOBENZAPRINE 10 MG: 10 TABLET, FILM COATED ORAL at 22:12

## 2020-11-17 RX ADMIN — Medication 10 ML: at 19:50

## 2020-11-17 RX ADMIN — IPRATROPIUM BROMIDE AND ALBUTEROL SULFATE 3 ML: .5; 3 SOLUTION RESPIRATORY (INHALATION) at 13:49

## 2020-11-17 RX ADMIN — ALBUTEROL SULFATE 10 MG: 2.5 SOLUTION RESPIRATORY (INHALATION) at 17:42

## 2020-11-17 NOTE — ED TRIAGE NOTES
Patient arrives ambulatory to triage with mask in place. Patient reports shortness of breath unrelieved by nebulizer treatment at home. Patient reports dry cough. No fevers. Recently tested negative for covid at [facility]. Patient reports hx asthma. Patient presents with audible wheezing, labored breathing. Reports recent weight gain and leg swelling.

## 2020-11-17 NOTE — ED PROVIDER NOTES
[address] [facility] EMERGENCY DEPT   Arrival Date/Time: 11/17/2020 @  4:23 PM      Naomi Falcon  MRN: [id number]      63 y.o. female    YOB: 1965   Telephone Information:   Mobile [phone] (home)     Seen in: ER05/05  Seen on 11/17/2020 @ 5:11 PM       Today's Chief Complaint:   Chief Complaint   Patient presents with    Shortness of Breath     HPI (3): 58-year-old female to the emergency department with increasing shortness of breath. Pressure heaviness on her chest.  Has been getting progressively worse. Much worse with any activity or exertion. She is visibly dyspneic and tachypneic. No history of congestive heart failure. But she has about 20 pound weight gain over the last several months    No fevers reported. No loss of taste or smell. Some cough when her breathing gets really bad    She communicated with her primary care times was advised to come to the emergency room    History of blood clots after having neck surgery. Not currently have any antiplatelet or anticoagulant medications on her med list.   HPI    Review of Systems (10+): Review of Systems   Constitutional: Positive for activity change, appetite change and fatigue. Negative for chills. HENT: Negative. Eyes: Negative. Respiratory: Positive for cough, shortness of breath and wheezing. Cardiovascular: Positive for chest pain. Negative for palpitations and leg swelling. Gastrointestinal: Negative for nausea and vomiting. Genitourinary: Negative. Musculoskeletal: Negative. Skin: Negative for color change and wound. Neurological: Negative for dizziness, weakness and numbness. Psychiatric/Behavioral: Negative. Past Medical History: Primary Care Doctor: Alvino Padilla MD  [X] Meds, Medical Hx, Surgical Hx, Family Hx, Social Hx are reviewed & located at the end of this note     Allergies:    Allergies   Allergen Reactions    Gabapentin Other (comments)     \"causes me to stop breathing. \"       Lyrica [Pregabalin] Hives    Niacin Hives    Nystatin Itching    Percocet [Oxycodone-Acetaminophen] Unknown (comments)         Key Anti-Platelet Anticoagulant Meds     The patient is on no antiplatelet meds or anticoagulants. Physical Exam (8+):  [X] Nursing documentation reviewed. Patient Vitals for the past 24 hrs:   Temp Pulse Resp BP SpO2   11/17/20 1742  71 20 (!) 209/79 96 %   11/17/20 1719  87  (!) 210/88    11/17/20 1349     94 %   11/17/20 1331 97.9 °F (36.6 °C) 80 22 (!) 182/90 94 %      Estimated body mass index is 62.17 kg/m² as calculated from the following:    Height as of this encounter: 5' 9\" (1.753 m). Weight as of this encounter: 191 kg (421 lb). Vital signs were reviewed. Physical Exam  Vitals signs and nursing note reviewed. Constitutional:       General: She is not in acute distress. Appearance: She is ill-appearing. HENT:      Head: Normocephalic and atraumatic. Mouth/Throat:      Mouth: Mucous membranes are moist.   Eyes:      Extraocular Movements: Extraocular movements intact. Pupils: Pupils are equal, round, and reactive to light. Neck:      Musculoskeletal: Normal range of motion. Cardiovascular:      Rate and Rhythm: Normal rate and regular rhythm. No extrasystoles are present. Pulmonary:      Breath sounds: Decreased breath sounds and wheezing present. Chest:      Chest wall: No mass or tenderness. Abdominal:      Palpations: Abdomen is soft. Tenderness: There is no guarding. Skin:     General: Skin is warm. Neurological:      Mental Status: She is alert and oriented to person, place, and time. Psychiatric:         Mood and Affect: Mood normal.         Behavior: Behavior normal.         MDM  MEDICAL DECISION MAKING: (Including Differential Dx, and Plan)   54-year-old female with increasing shortness of breath. No fever no chills some cough.   No loss of taste or smell.   Differential Diagnosis: Anemia, asthma versus COPD, other infection, CHF   Plan: xray labs, ecg     This is a new problem that does need additional workup   Labs/Radiographs/ECG were ordered: yes   CT/US/XRay/MRI were visualized by me: yes   Obtained and Reviewed Old Records or History from someone other than the patient: chart     The patient's problem is:  high    Diagnostic Options are:  minimal risk    Management Options are:  high risk     Data/Management:  (pia, Albaro craig)   Lab findings during this visit:   Recent Results (from the past 48 hour(s))   CBC WITH AUTOMATED DIFF    Collection Time: 11/17/20  1:36 PM   Result Value Ref Range    WBC 11.0 4.3 - 11.1 K/uL    RBC 4.28 4.05 - 5.2 M/uL    HGB 12.5 11.7 - 15.4 g/dL    HCT 40.7 35.8 - 46.3 %    MCV 95.1 79.6 - 97.8 FL    MCH 29.2 26.1 - 32.9 PG    MCHC 30.7 (L) 31.4 - 35.0 g/dL    RDW 14.3 11.9 - 14.6 %    PLATELET 740 193 - 161 K/uL    MPV 10.4 9.4 - 12.3 FL    ABSOLUTE NRBC 0.00 0.0 - 0.2 K/uL    DF AUTOMATED      NEUTROPHILS 69 43 - 78 %    LYMPHOCYTES 24 13 - 44 %    MONOCYTES 5 4.0 - 12.0 %    EOSINOPHILS 1 0.5 - 7.8 %    BASOPHILS 1 0.0 - 2.0 %    IMMATURE GRANULOCYTES 1 0.0 - 5.0 %    ABS. NEUTROPHILS 7.6 1.7 - 8.2 K/UL    ABS. LYMPHOCYTES 2.6 0.5 - 4.6 K/UL    ABS. MONOCYTES 0.6 0.1 - 1.3 K/UL    ABS. EOSINOPHILS 0.1 0.0 - 0.8 K/UL    ABS. BASOPHILS 0.1 0.0 - 0.2 K/UL    ABS. IMM.  GRANS. 0.1 0.0 - 0.5 K/UL   METABOLIC PANEL, COMPREHENSIVE    Collection Time: 11/17/20  1:36 PM   Result Value Ref Range    Sodium 142 136 - 145 mmol/L    Potassium 3.9 3.5 - 5.1 mmol/L    Chloride 107 98 - 107 mmol/L    CO2 27 21 - 32 mmol/L    Anion gap 8 7 - 16 mmol/L    Glucose 139 (H) 65 - 100 mg/dL    BUN 19 6 - 23 MG/DL    Creatinine 0.94 0.6 - 1.0 MG/DL    GFR est AA >60 >60 ml/min/1.73m2    GFR est non-AA >60 >60 ml/min/1.73m2    Calcium 8.8 8.3 - 10.4 MG/DL    Bilirubin, total 1.1 0.2 - 1.1 MG/DL    ALT (SGPT) 19 12 - 65 U/L    AST (SGOT) 10 (L) 15 - 37 U/L    Alk. phosphatase 150 (H) 50 - 136 U/L    Protein, total 7.8 6.3 - 8.2 g/dL    Albumin 3.1 (L) 3.5 - 5.0 g/dL    Globulin 4.7 (H) 2.3 - 3.5 g/dL    A-G Ratio 0.7 (L) 1.2 - 3.5     TROPONIN-HIGH SENSITIVITY    Collection Time: 11/17/20  1:36 PM   Result Value Ref Range    Troponin-High Sensitivity 14.9 (H) 0 - 14 pg/mL   NT-PRO BNP    Collection Time: 11/17/20  1:36 PM   Result Value Ref Range    NT pro- (H) 5 - 125 PG/ML   EKG, 12 LEAD, INITIAL    Collection Time: 11/17/20  1:37 PM   Result Value Ref Range    Ventricular Rate 77 BPM    Atrial Rate 77 BPM    P-R Interval 142 ms    QRS Duration 94 ms    Q-T Interval 400 ms    QTC Calculation (Bezet) 452 ms    Calculated P Axis 54 degrees    Calculated R Axis 43 degrees    Calculated T Axis 35 degrees    Diagnosis       Normal sinus rhythm  Normal ECG  When compared with ECG of 17-NOV-2020 13:37,  No significant change was found  Confirmed by Tonia Washington MD (), ARAMIS GALLEGOS ([id number]) on 11/17/2020 3:25:08 PM     TROPONIN-HIGH SENSITIVITY    Collection Time: 11/17/20  4:11 PM   Result Value Ref Range    Troponin-High Sensitivity 12.9 0 - 14 pg/mL     Radiology studies during this visit: Xr Chest Port    Result Date: 11/17/2020  IMPRESSION: 1. No acute cardiopulmonary abnormality. Medications given in the ED:   Medications   albuterol (PROVENTIL VENTOLIN) nebulizer solution 10 mg (10 mg Nebulization New Bag 11/17/20 6400)   albuterol-ipratropium (DUO-NEB) 2.5 MG-0.5 MG/3 ML (3 mL Nebulization Given 11/17/20 1349)   furosemide (LASIX) injection 40 mg (40 mg IntraVENous Given 11/17/20 4891)        Procedure Documentation:    (.addlac  .addabscess   . addreduction   . addintubation    . addprocdoc)      Procedures    Recheck and Additional Documentation:  (use .addrecheck  . addsepsis   . addstroke   . addhip  .addcctime . emergcnt )     Patient moved to room. No significant improvement with Lasix or breathing treatment. On recheck heart rate is about 90.   Ox saturations are 97%. Blood pressure is increased to 209/79    She appears very tachypneic and dyspneic. Very uncomfortable    Placed on oxygen    I had her stand at the bedside. She was not able to walk in place. Heart rate increased from mid 90s to 115-120. Oxygen saturations dropped slightly to 94. Lungs are clear. She has some wheezing sounds with some limited primarily from her upper airway. Will give her additional blood pressure medication. CT of the chest has been ordered to evaluate for the presence of underlying lung disease or pulmonary emboli given her history of DVTs. Other ED Course Notes:        I wore appropriate PPE throughout this patient's ED encounter. Impression and Disposition: (.jluis     .francescaupstakatherine   . LLIUKGLCKQ  )     Handoff Documentation     Patient care Handed off from reyna  to cha @ 6:44 PM      Discussed the patient's complaint, presentation, vitals and differential diagnosis.  Discussed the patient's current status, and response to treatments   Reviewed critical lab values, allergies, and other factors.     6:44 PM, 11/17/2020, Elizabeth Hearn MD           Past Medical History:      Past Medical History:   Diagnosis Date    Asthma     CAD (coronary artery disease)     mild non-obstuctive CAD cath 08/12/19 echo 08/13/19EF 55-60%    Chronic pain     Diabetes ([address]     avg 156, s/s hypo 62    Hypertension     Ill-defined condition     chronic back problems    Morbid obesity ([address]     Thromboembolus ([address] 2019    dvt x2 right leg     Past Surgical History:   Procedure Laterality Date    COLONOSCOPY N/A 11/4/2019    COLONOSCOPY/ BMI 59 performed by Neda Kothari MD at [address] [facility] HX CERVICAL FUSION  2019     [address]    HX CHOLECYSTECTOMY      HX GI      hernia repair    HX HEART CATHETERIZATION      no stent    HX HERNIA REPAIR      HX ORTHOPAEDIC Right 2012    trigger-finger release     Family History   Problem Relation Age of Onset    Hypertension Mother     Diabetes Mother     Heart Attack Father     Cancer Brother         colon    Other Sister         brain aneurysm      Social History     Tobacco Use    Smoking status: Never Smoker    Smokeless tobacco: Never Used   Substance Use Topics    Alcohol use: Never     Frequency: Never    Drug use: Never     Prior to Admission Medications   Prescriptions Last Dose Informant Patient Reported? Taking? Levemir FlexTouch U-100 Insuln 100 unit/mL (3 mL) inpn   Yes No   Si Units by SubCUTAneous route daily. NovoLOG Flexpen U-100 Insulin 100 unit/mL (3 mL) inpn   No No   Sig: Use with correction scale 2/50>150, max daily dose 30 units   OTHER   No No   Sig: One hospital bed   Ozempic 0.25 mg or 0.5 mg(2 mg/1.5 mL) sub-q pen   No No   Si.5 mg by SubCUTAneous route every seven (7) days. atorvastatin (LIPITOR) 40 mg tablet   No No   Sig: Take 1 Tab by mouth daily. canagliflozin (Invokana) 300 mg tablet   No No   Sig: Take 1 Tab by mouth Daily (before breakfast). ergocalciferol (ERGOCALCIFEROL) 1,250 mcg (50,000 unit) capsule   No No   Sig: Take one tablet twice a week for 6 weeks   glimepiride (AMARYL) 4 mg tablet   No No   Sig: Take 1 Tab by mouth every morning. glucose blood VI test strips (Accu-Chek Drea Plus test strp) strip   Yes No   Sig: Use to check blood sugar 3x daily and PRN   hydrALAZINE (APRESOLINE) 50 mg tablet   No No   Sig: Take 1 Tab by mouth two (2) times a day. irbesartan 300 mg tab 300 mg, hydroCHLOROthiazide 12.5 mg cap 12.5 mg   Yes No   Sig: Take  by mouth daily. metFORMIN ER (GLUCOPHAGE XR) 750 mg tablet   No No   Sig: Take 1 Tab by mouth daily. mirabegron ER (MYRBETRIQ) 25 mg ER tablet   Yes No   Sig: Take 25 mg by mouth daily. nebivolol (BYSTOLIC) 10 mg tablet   Yes No   Sig: Take 10 mg by mouth daily. torsemide (DEMADEX) 10 mg tablet   No No   Sig: Take 1 Tab by mouth daily.       Facility-Administered Medications: None

## 2020-11-18 VITALS
HEIGHT: 69 IN | WEIGHT: 293 LBS | OXYGEN SATURATION: 95 % | BODY MASS INDEX: 43.4 KG/M2 | DIASTOLIC BLOOD PRESSURE: 66 MMHG | HEART RATE: 90 BPM | SYSTOLIC BLOOD PRESSURE: 150 MMHG | TEMPERATURE: 97.9 F | RESPIRATION RATE: 18 BRPM

## 2020-11-18 PROBLEM — R06.02 SOB (SHORTNESS OF BREATH): Status: ACTIVE | Noted: 2020-11-18

## 2020-11-18 LAB
ANION GAP SERPL CALC-SCNC: 9 MMOL/L (ref 7–16)
BASOPHILS # BLD: 0.1 K/UL (ref 0–0.2)
BASOPHILS NFR BLD: 1 % (ref 0–2)
BUN SERPL-MCNC: 16 MG/DL (ref 6–23)
CALCIUM SERPL-MCNC: 9 MG/DL (ref 8.3–10.4)
CHLORIDE SERPL-SCNC: 106 MMOL/L (ref 98–107)
CO2 SERPL-SCNC: 27 MMOL/L (ref 21–32)
CREAT SERPL-MCNC: 0.84 MG/DL (ref 0.6–1)
DIFFERENTIAL METHOD BLD: ABNORMAL
EOSINOPHIL # BLD: 0.1 K/UL (ref 0–0.8)
EOSINOPHIL NFR BLD: 1 % (ref 0.5–7.8)
ERYTHROCYTE [DISTWIDTH] IN BLOOD BY AUTOMATED COUNT: 14.4 % (ref 11.9–14.6)
GLUCOSE BLD STRIP.AUTO-MCNC: 164 MG/DL (ref 65–100)
GLUCOSE BLD STRIP.AUTO-MCNC: 211 MG/DL (ref 65–100)
GLUCOSE SERPL-MCNC: 143 MG/DL (ref 65–100)
HCT VFR BLD AUTO: 38.3 % (ref 35.8–46.3)
HGB BLD-MCNC: 11.5 G/DL (ref 11.7–15.4)
IMM GRANULOCYTES # BLD AUTO: 0 K/UL (ref 0–0.5)
IMM GRANULOCYTES NFR BLD AUTO: 0 % (ref 0–5)
LYMPHOCYTES # BLD: 2.6 K/UL (ref 0.5–4.6)
LYMPHOCYTES NFR BLD: 25 % (ref 13–44)
MCH RBC QN AUTO: 28.8 PG (ref 26.1–32.9)
MCHC RBC AUTO-ENTMCNC: 30 G/DL (ref 31.4–35)
MCV RBC AUTO: 95.8 FL (ref 79.6–97.8)
MONOCYTES # BLD: 0.6 K/UL (ref 0.1–1.3)
MONOCYTES NFR BLD: 6 % (ref 4–12)
NEUTS SEG # BLD: 7.3 K/UL (ref 1.7–8.2)
NEUTS SEG NFR BLD: 68 % (ref 43–78)
NRBC # BLD: 0 K/UL (ref 0–0.2)
PLATELET # BLD AUTO: 330 K/UL (ref 150–450)
PMV BLD AUTO: 10.5 FL (ref 9.4–12.3)
POTASSIUM SERPL-SCNC: 3.5 MMOL/L (ref 3.5–5.1)
RBC # BLD AUTO: 4 M/UL (ref 4.05–5.2)
SODIUM SERPL-SCNC: 142 MMOL/L (ref 138–145)
TROPONIN-HIGH SENSITIVITY: 16.8 PG/ML (ref 0–14)
WBC # BLD AUTO: 10.7 K/UL (ref 4.3–11.1)

## 2020-11-18 PROCEDURE — 74011250637 HC RX REV CODE- 250/637: Performed by: FAMILY MEDICINE

## 2020-11-18 PROCEDURE — 84484 ASSAY OF TROPONIN QUANT: CPT

## 2020-11-18 PROCEDURE — 96375 TX/PRO/DX INJ NEW DRUG ADDON: CPT

## 2020-11-18 PROCEDURE — 99218 HC RM OBSERVATION: CPT

## 2020-11-18 PROCEDURE — 74011000250 HC RX REV CODE- 250: Performed by: FAMILY MEDICINE

## 2020-11-18 PROCEDURE — 36415 COLL VENOUS BLD VENIPUNCTURE: CPT

## 2020-11-18 PROCEDURE — 85025 COMPLETE CBC W/AUTO DIFF WBC: CPT

## 2020-11-18 PROCEDURE — 82962 GLUCOSE BLOOD TEST: CPT

## 2020-11-18 PROCEDURE — 80048 BASIC METABOLIC PNL TOTAL CA: CPT

## 2020-11-18 PROCEDURE — 74011636637 HC RX REV CODE- 636/637: Performed by: FAMILY MEDICINE

## 2020-11-18 PROCEDURE — 74011250636 HC RX REV CODE- 250/636: Performed by: FAMILY MEDICINE

## 2020-11-18 PROCEDURE — 94640 AIRWAY INHALATION TREATMENT: CPT

## 2020-11-18 PROCEDURE — 96372 THER/PROPH/DIAG INJ SC/IM: CPT

## 2020-11-18 RX ORDER — IPRATROPIUM BROMIDE AND ALBUTEROL SULFATE 2.5; .5 MG/3ML; MG/3ML
3 SOLUTION RESPIRATORY (INHALATION)
Qty: 30 NEBULE | Refills: 1 | Status: SHIPPED | OUTPATIENT
Start: 2020-11-18 | End: 2022-04-06

## 2020-11-18 RX ORDER — IPRATROPIUM BROMIDE AND ALBUTEROL SULFATE 2.5; .5 MG/3ML; MG/3ML
3 SOLUTION RESPIRATORY (INHALATION)
Qty: 30 NEBULE | Refills: 1 | OUTPATIENT
Start: 2020-11-18 | End: 2020-11-18

## 2020-11-18 RX ORDER — ALBUTEROL SULFATE 90 UG/1
1 AEROSOL, METERED RESPIRATORY (INHALATION)
Qty: 1 INHALER | Refills: 1 | OUTPATIENT
Start: 2020-11-18 | End: 2020-11-18 | Stop reason: SDUPTHER

## 2020-11-18 RX ORDER — HYDROCHLOROTHIAZIDE 12.5 MG/1
12.5 CAPSULE ORAL DAILY
Status: DISCONTINUED | OUTPATIENT
Start: 2020-11-18 | End: 2020-11-18 | Stop reason: HOSPADM

## 2020-11-18 RX ORDER — TIZANIDINE 4 MG/1
4 TABLET ORAL
Qty: 15 TAB | Refills: 0 | Status: SHIPPED | OUTPATIENT
Start: 2020-11-18 | End: 2020-11-23

## 2020-11-18 RX ORDER — PREDNISONE 20 MG/1
40 TABLET ORAL
Qty: 10 TAB | Refills: 0 | OUTPATIENT
Start: 2020-11-19 | End: 2020-11-18

## 2020-11-18 RX ORDER — IPRATROPIUM BROMIDE AND ALBUTEROL SULFATE 2.5; .5 MG/3ML; MG/3ML
3 SOLUTION RESPIRATORY (INHALATION)
Status: DISCONTINUED | OUTPATIENT
Start: 2020-11-18 | End: 2020-11-18 | Stop reason: HOSPADM

## 2020-11-18 RX ORDER — ALBUTEROL SULFATE 90 UG/1
1 AEROSOL, METERED RESPIRATORY (INHALATION)
Qty: 1 INHALER | Refills: 1 | Status: SHIPPED | OUTPATIENT
Start: 2020-11-18

## 2020-11-18 RX ORDER — PREDNISONE 20 MG/1
40 TABLET ORAL
Qty: 10 TAB | Refills: 0 | Status: SHIPPED | OUTPATIENT
Start: 2020-11-19 | End: 2020-11-24

## 2020-11-18 RX ORDER — VALSARTAN 320 MG/1
320 TABLET ORAL DAILY
Status: DISCONTINUED | OUTPATIENT
Start: 2020-11-18 | End: 2020-11-18 | Stop reason: HOSPADM

## 2020-11-18 RX ORDER — BUDESONIDE AND FORMOTEROL FUMARATE DIHYDRATE 80; 4.5 UG/1; UG/1
2 AEROSOL RESPIRATORY (INHALATION) 2 TIMES DAILY
Qty: 1 INHALER | Refills: 0 | Status: SHIPPED | OUTPATIENT
Start: 2020-11-18 | End: 2022-04-06

## 2020-11-18 RX ORDER — MORPHINE SULFATE 2 MG/ML
2 INJECTION, SOLUTION INTRAMUSCULAR; INTRAVENOUS ONCE
Status: COMPLETED | OUTPATIENT
Start: 2020-11-18 | End: 2020-11-18

## 2020-11-18 RX ORDER — PREDNISONE 10 MG/1
40 TABLET ORAL
Status: DISCONTINUED | OUTPATIENT
Start: 2020-11-19 | End: 2020-11-18 | Stop reason: HOSPADM

## 2020-11-18 RX ADMIN — ENOXAPARIN SODIUM 40 MG: 40 INJECTION SUBCUTANEOUS at 09:55

## 2020-11-18 RX ADMIN — MORPHINE SULFATE 2 MG: 2 INJECTION, SOLUTION INTRAMUSCULAR; INTRAVENOUS at 06:46

## 2020-11-18 RX ADMIN — METHYLPREDNISOLONE SODIUM SUCCINATE 60 MG: 125 INJECTION, POWDER, FOR SOLUTION INTRAMUSCULAR; INTRAVENOUS at 09:21

## 2020-11-18 RX ADMIN — INSULIN HUMAN 6 UNITS: 100 INJECTION, SOLUTION PARENTERAL at 12:06

## 2020-11-18 RX ADMIN — NEBIVOLOL HYDROCHLORIDE 10 MG: 10 TABLET ORAL at 09:12

## 2020-11-18 RX ADMIN — METFORMIN HYDROCHLORIDE 1000 MG: 500 TABLET ORAL at 09:12

## 2020-11-18 RX ADMIN — VALSARTAN 320 MG: 320 TABLET, FILM COATED ORAL at 09:13

## 2020-11-18 RX ADMIN — GLIMEPIRIDE 4 MG: 4 TABLET ORAL at 09:12

## 2020-11-18 RX ADMIN — ATORVASTATIN CALCIUM 40 MG: 40 TABLET, FILM COATED ORAL at 09:20

## 2020-11-18 RX ADMIN — TORSEMIDE 10 MG: 20 TABLET ORAL at 09:12

## 2020-11-18 RX ADMIN — IPRATROPIUM BROMIDE AND ALBUTEROL SULFATE 3 ML: .5; 3 SOLUTION RESPIRATORY (INHALATION) at 11:27

## 2020-11-18 RX ADMIN — IPRATROPIUM BROMIDE AND ALBUTEROL SULFATE 3 ML: .5; 3 SOLUTION RESPIRATORY (INHALATION) at 07:24

## 2020-11-18 RX ADMIN — INSULIN HUMAN 3 UNITS: 100 INJECTION, SOLUTION PARENTERAL at 08:07

## 2020-11-18 RX ADMIN — HYDRALAZINE HYDROCHLORIDE 50 MG: 25 TABLET, FILM COATED ORAL at 09:20

## 2020-11-18 RX ADMIN — SODIUM CHLORIDE 75 ML/HR: 900 INJECTION, SOLUTION INTRAVENOUS at 06:42

## 2020-11-18 RX ADMIN — HYDROCHLOROTHIAZIDE 12.5 MG: 12.5 CAPSULE ORAL at 09:12

## 2020-11-18 RX ADMIN — INSULIN GLARGINE 48 UNITS: 100 INJECTION, SOLUTION SUBCUTANEOUS at 09:21

## 2020-11-18 RX ADMIN — Medication 5 ML: at 08:08

## 2020-11-18 NOTE — DISCHARGE SUMMARY
Hospitalist Discharge Summary     Patient ID:  Tee Huang  [id number]  22 y.o.  1965  Admit date: 11/17/2020  4:23 PM  Discharge date and time: 11/18/2020  Attending: No att. providers found  PCP:  Twan López MD  Treatment Team: Primary Nurse: Yobany Gan; Utilization Review: Leticia Castro RN    Principal Diagnosis SOB (shortness of breath)   Principal Problem:    SOB (shortness of breath) (11/18/2020)    Active Problems:    Chronic deep vein thrombosis (DVT) of lower extremity ([address] (7/29/2019)      Diabetes mellitus ([address] (7/29/2019)      Edema (10/20/2017)      Benign essential hypertension (7/28/2013)      Overview: Echo (8/13/19): EF 55-60%. Mild LAE. Morbid obesity ([address] (9/11/2013)      Overview: Last Assessment & Plan: Body mass index is 59.38 kg/m². Last Assessment & Plan: Body mass index is 59.38 kg/m². Anxiety (7/29/2019)      Chronic back pain (7/28/2013)      Coronary arteriosclerosis (7/29/2019)             Hospital Course:  Please refer to the admission H&P for details of presentation. In summary, the patient is  a 63 y.o. female with a past medical history of asthma, DM type II, HTN, CAD, morbid obesity, BRODIE who presents to the ER with report of chest tightness and SOB for the past several days. She admits to dry cough, denies fevers or chills. Was recently tested at [facility] [place] and was found to be negative. Admits to some wheezing. Her SOB with wheezing seems to be an ongoing chronic issue and she sees pulmonology as outpatient. She has a history of non-compliance to her CPAP as well. She has a history of DVT but completed treatment with Xarelto several months ago. In ED, her rapid covid in ED was negative. CBC and CMP were unremarkable. Her BNP and trop were slightly elevated most likely 2/2 demand ischemia. She denies chest pain and her EKG was unremarkable.  She was admitted for SOB most likely 2/2 acute asthma exacerbation. Her CXR was unremarkable and her CT chest was negative for PT as well as any acute findings. She was started on Solumedrol IV here and symptoms significantly improved. She remained on RA with O2 sat adequate throughout hospital course. She strongly desired to go home afternoon after admission as she felt a lot better and ER bed hurt her back. D/w her that we are still waiting on covid PCR but there was low suspicion that she had covid but we will call her if PCR came back positive. Discussed with patient the importance of staying compliant with using her CPAP at night time. Also d/t her frequency of SOB and wheezing, will start her on Symbicort for asthma control and also continue with prednisone for 5 days for acute exacerbation. Refills for her albuterol were given. Return precautions reviewed with patient and her daughter and they both verbalized understanding. Pt was instructed to follow up with her PCP in 3-5 days and her Pulmonologist in 1-2 weeks. Significant Diagnostic Studies:   CT CHEST W CONT   Final Result   IMPRESSION:   1. No evidence of pulmonary embolism. 2. Cardiomegaly. XR CHEST PORT   Final Result   IMPRESSION:   1. No acute cardiopulmonary abnormality. Labs: Results:       Chemistry Recent Labs     11/18/20  0635 11/17/20  1336   * 139*    142   K 3.5 3.9    107   CO2 27 27   BUN 16 19   CREA 0.84 0.94   CA 9.0 8.8   AGAP 9 8   AP  --  150*   TP  --  7.8   ALB  --  3.1*   GLOB  --  4.7*   AGRAT  --  0.7*      CBC w/Diff Recent Labs     11/18/20  0635 11/17/20  1336   WBC 10.7 11.0   RBC 4.00* 4.28   HGB 11.5* 12.5   HCT 38.3 40.7    343   GRANS 68 69   LYMPH 25 24   EOS 1 1      Cardiac Enzymes No results for input(s): CPK, CKND1, GINA in the last 72 hours. No lab exists for component: CKRMB, TROIP   Coagulation No results for input(s): PTP, INR, APTT, INREXT, INREXT in the last 72 hours.     Lipid Panel Lab Results   Component Value Date/Time    Cholesterol, total 185 10/07/2020 02:54 PM    HDL Cholesterol 64 10/07/2020 02:54 PM    LDL Chol Calc (NIH) 101 (H) 10/07/2020 02:54 PM    VLDL Cholesterol [place] 20 10/07/2020 02:54 PM    Triglyceride 112 10/07/2020 02:54 PM      BNP No results for input(s): BNPP in the last 72 hours. Liver Enzymes Recent Labs     11/17/20  1336   TP 7.8   ALB 3.1*   *      Thyroid Studies Lab Results   Component Value Date/Time    TSH 1.810 10/07/2020 02:54 PM            Discharge Exam:  Visit Vitals  BP (!) 150/66   Pulse 90   Temp 97.9 °F (36.6 °C)   Resp 18   Ht 5' 9\" (1.753 m)   Wt (!) 191 kg (421 lb)   SpO2 95%   BMI 62.17 kg/m²     General appearance: alert, cooperative, no distress, appears stated age, morbidly obese  Lungs: Mild expiratory wheezing b/l, difficult to assess d/t pt's body habitus  Heart: regular rate and rhythm, S1, S2 normal, no murmur, click, rub or gallop  Abdomen: soft, non-tender. Bowel sounds normal. No masses,  no organomegaly  Extremities: b/l chronic lymphedema. Neurologic: Grossly normal    Disposition: home  Discharge Condition: stable  Patient Instructions:   Discharge Medication List as of 11/18/2020  2:37 PM      START taking these medications    Details   budesonide-formoteroL (SYMBICORT) 80-4.5 mcg/actuation HFAA Take 2 Puffs by inhalation two (2) times a day., Print, Disp-1 Inhaler,R-0      tiZANidine (ZANAFLEX) 4 mg tablet Take 1 Tab by mouth every six (6) hours as needed for Muscle Spasm(s) or Pain for up to 5 days. , Print, Disp-15 Tab,R-0         CONTINUE these medications which have CHANGED    Details   albuterol (PROVENTIL HFA, VENTOLIN HFA, PROAIR HFA) 90 mcg/actuation inhaler Take 1 Puff by inhalation every four (4) hours as needed for Wheezing., Print, Disp-1 Inhaler,R-1      albuterol-ipratropium (DUO-NEB) 2.5 mg-0.5 mg/3 ml nebu 3 mL by Nebulization route every four (4) hours as needed for Wheezing., Print, Disp-30 Nebule,R-1 predniSONE (DELTASONE) 20 mg tablet Take 40 mg by mouth daily (with breakfast) for 5 days. , Print, Disp-10 Tab,R-0         CONTINUE these medications which have NOT CHANGED    Details   atorvastatin (LIPITOR) 40 mg tablet Take 1 Tab by mouth daily. , Normal, Disp-30 Tab,R-6      ergocalciferol (ERGOCALCIFEROL) 1,250 mcg (50,000 unit) capsule Take one tablet twice a week for 6 weeks, Normal, Disp-12 Cap,R-0      OTHER One hospital bed, Print, Disp-1 Units,R-0      Levemir FlexTouch U-100 Insuln 100 unit/mL (3 mL) inpn 48 Units by SubCUTAneous route daily. , Historical Med, CHARANJIT      glucose blood VI test strips (Accu-Chek Drea Plus test strp) strip Use to check blood sugar 3x daily and PRN, Historical Med      NovoLOG Flexpen U-100 Insulin 100 unit/mL (3 mL) inpn Use with correction scale 2/50>150, max daily dose 30 units, Normal, Disp-9 Pen,R-3,CHARANJIT      Ozempic 0.25 mg or 0.5 mg(2 mg/1.5 mL) sub-q pen 0.5 mg by SubCUTAneous route every seven (7) days. , Normal, Disp-3 Box,R-3,CHARANJIT      canagliflozin (Invokana) 300 mg tablet Take 1 Tab by mouth Daily (before breakfast). , Normal, Disp-90 Tab,R-3      metFORMIN ER (GLUCOPHAGE XR) 750 mg tablet Take 1 Tab by mouth daily. , Normal, Disp-90 Tab,R-3      torsemide (DEMADEX) 10 mg tablet Take 1 Tab by mouth daily. , Normal, Disp-30 Tab,R-3      glimepiride (AMARYL) 4 mg tablet Take 1 Tab by mouth every morning., Normal, Disp-90 Tab,R-3      mirabegron ER (MYRBETRIQ) 25 mg ER tablet Take 25 mg by mouth daily. , Historical Med      hydrALAZINE (APRESOLINE) 50 mg tablet Take 1 Tab by mouth two (2) times a day., Normal, Disp-60 Tab, R-3      irbesartan 300 mg tab 300 mg, hydroCHLOROthiazide 12.5 mg cap 12.5 mg Take  by mouth daily. , Historical Med      nebivolol (BYSTOLIC) 10 mg tablet Take 10 mg by mouth daily. , Historical Med             Activity: Activity as tolerated  Diet: Cardiac Diet and Diabetic Diet    Follow-up:   F/u with PCP in 3-5 days      Time spent to discharge patient 35 minutes  Signed:   Blossom Veal DO  11/18/2020  1:59 PM

## 2020-11-18 NOTE — ED NOTES
Patient provided a hospital bed to sleep in due to being uncomfortable in the ER stretcher. She appears to be in less distress now, no longer experiencing back spasms and sleeping comfortably.

## 2020-11-18 NOTE — ED NOTES
I have reviewed discharge instructions with the patient. The patient verbalized understanding. Patient left ED via Discharge Method: wheelchair to Home with daughter    Opportunity for questions and clarification provided. Patient given 7 scripts. To continue your aftercare when you leave the hospital, you may receive an automated call from our care team to check in on how you are doing. This is a free service and part of our promise to provide the best care and service to meet your aftercare needs.  If you have questions, or wish to unsubscribe from this service please call [phone]. Thank you for Choosing our [facility].

## 2020-11-18 NOTE — ACP (ADVANCE CARE PLANNING)
Advance Care Planning     Advance Care Planning Activator (Inpatient)  Conversation Note      Date of ACP Conversation: 11/18/20     Conversation Conducted with:   Patient with Decision Making Capacity    ACP Activator: Beatris makes decisions on behalf of the incapacitated patient: Decision Maker is asked to consider and make decisions based on patient values, known preferences, or best interests. Health Care Decision Maker:    Current Designated Health Care Decision Maker:   Primary Decision Maker: Hieu Cuenca - Other Relative - [phone]    Secondary Decision Maker: Florence Davila - Daughter - [phone]  (If there is a [address] named in the [address]" box in the ACP activity, but it is not visible above, be sure to open that field and then select the health care decision maker relationship (ie \"primary\") in the blank space to the right of the name.) Validate  this information as still accurate & up-to-date; edit [place] field as needed.)    Note: Assess and validate information in current ACP documents, as indicated. Note: If the relationship of these Decision-Makers to the patient does NOT follow your state's Next of Kin hierarchy, recommend that patient complete ACP document that meets state-specific requirements to allow them to act on the patient's behalf when appropriate. Care Preferences    Ventilation: \"If you were in your present state of health and suddenly became very ill and were unable to breathe on your own, what would your preference be about the use of a ventilator (breathing machine) if it were available to you? \"      If patient would desire the use of a ventilator (breathing machine), answer \"yes\", if not \"no\": These are patient's current wishes as discussed at bedside today and are not intended to take place of [address]     \"If your health worsens and it becomes clear that your chance of recovery is unlikely, what would your preference be about the use of a ventilator (breathing machine) if it were available to you? \"     Would the patient desire the use of a ventilator (breathing machine)? YES      Resuscitation  \"CPR works best to restart the heart when there is a sudden event, like a heart attack, in someone who is otherwise healthy. Unfortunately, CPR does not typically restart the heart for people who have serious health conditions or who are very sick. \"    \"In the event your heart stopped as a result of an underlying serious health condition, would you want attempts to be made to restart your heart (answer \"yes\" for attempt to resuscitate) or would you prefer a natural death (answer \"no\" for do not attempt to resuscitate)? \" yes      NOTE: If the patient has a valid advance directive AND now provides care preference(s) that are inconsistent with that prior directive, advise the patient to consider either: creating a new advance directive that complies with state-specific requirements; or, if that is not possible, orally revoking that prior directive in accordance with state-specific requirements, which must be documented in the EHR. [] Yes  [] No   Educated Patient / Berdie Grumbling regarding differences between Advance Directives and portable DNR orders.     Length of ACP Conversation in minutes:      Conversation Outcomes:  [] ACP discussion completed  [] Existing advance directive reviewed with patient; no changes to patient's previously recorded wishes     [] New Advance Directive completed   [] Portable Do Not Resuscitate prepared for Provider review and signature  [] POLST/POST/MOLST/MOST prepared for Provider review and signature      Follow-up plan:    [] Schedule follow-up conversation to continue planning  [] Referred individual to Provider for additional questions/concerns   [] Advised patient/agent/surrogate to review completed ACP document and update if needed with changes in condition, patient preferences or care setting     [x] This note routed to one or more involved healthcare providers      These are patient's current wishes as discussed at bedside today and are not intended to take place of [address] SW-CM wore appropriate PPE and goggles.

## 2020-11-18 NOTE — ED NOTES
Pt requesting to leave if medically appropriate. Dr. Borges How consulted via [employer]. Pt bedsheets and bed cleaned.

## 2020-11-18 NOTE — H&P
HOSPITALIST INITIAL HISTORY AND PHYSICAL    NAME:  Jayesh Calvillo   Age:  63 y.o.  :   1965   MRN:   [id number]  PCP: Bebo Jade MD  Consulting MD:  Treatment Team: Attending Provider: Lucinda Wyatt MD; Primary Nurse: Paz Sanchez    CHIEF COMPLAINT: SOB    HISTORY OF PRESENT ILLNESS:   Jayesh Calvillo is a 63 y.o. female with a past medical history of asthma, DM type II, HTN, CAD, morbid obesity who presents to the ER with report of chest tightness and SOB for the past several days. She admits to dry cough, denies fevers or chills. Was recently tested at [facility] for [place] and was found to be negative. Admits to some wheezing. Reports feeling a bit better now, but complains of back pain from lying in ER bed. Leonel Holloway REVIEW OF SYSTEMS: Comprehensive ROS performed. Denies fevers, chill, nausea, vomiting, otherwise negative. Past Medical History:   Diagnosis Date    Asthma     CAD (coronary artery disease)     mild non-obstuctive CAD cath 19 echo 19EF 55-60%    Chronic pain     Diabetes ([address]     avg 156, s/s hypo 62    Hypertension     Ill-defined condition     chronic back problems    Morbid obesity ([address]     Thromboembolus ([address] 2019    dvt x2 right leg        Past Surgical History:   Procedure Laterality Date    COLONOSCOPY N/A 2019    COLONOSCOPY/ BMI 59 performed by Taiwo Lovelace MD at [facility] HX CERVICAL FUSION       [address]    HX CHOLECYSTECTOMY      HX GI      hernia repair    HX HEART CATHETERIZATION      no stent    HX HERNIA REPAIR      HX ORTHOPAEDIC Right 2012    trigger-finger release       Prior to Admission Medications   Prescriptions Last Dose Informant Patient Reported? Taking? Levemir FlexTouch U-100 Insuln 100 unit/mL (3 mL) inpn   Yes No   Si Units by SubCUTAneous route daily.    NovoLOG Flexpen U-100 Insulin 100 unit/mL (3 mL) inpn   No No   Sig: Use with correction scale 2/50>150, max daily dose 30 units   OTHER   No No   Sig: One hospital bed   Ozempic 0.25 mg or 0.5 mg(2 mg/1.5 mL) sub-q pen   No No   Si.5 mg by SubCUTAneous route every seven (7) days. atorvastatin (LIPITOR) 40 mg tablet   No No   Sig: Take 1 Tab by mouth daily. canagliflozin (Invokana) 300 mg tablet   No No   Sig: Take 1 Tab by mouth Daily (before breakfast). ergocalciferol (ERGOCALCIFEROL) 1,250 mcg (50,000 unit) capsule   No No   Sig: Take one tablet twice a week for 6 weeks   glimepiride (AMARYL) 4 mg tablet   No No   Sig: Take 1 Tab by mouth every morning. glucose blood VI test strips (Accu-Chek Drea Plus test strp) strip   Yes No   Sig: Use to check blood sugar 3x daily and PRN   hydrALAZINE (APRESOLINE) 50 mg tablet   No No   Sig: Take 1 Tab by mouth two (2) times a day. irbesartan 300 mg tab 300 mg, hydroCHLOROthiazide 12.5 mg cap 12.5 mg   Yes No   Sig: Take  by mouth daily. metFORMIN ER (GLUCOPHAGE XR) 750 mg tablet   No No   Sig: Take 1 Tab by mouth daily. mirabegron ER (MYRBETRIQ) 25 mg ER tablet   Yes No   Sig: Take 25 mg by mouth daily. nebivolol (BYSTOLIC) 10 mg tablet   Yes No   Sig: Take 10 mg by mouth daily. torsemide (DEMADEX) 10 mg tablet   No No   Sig: Take 1 Tab by mouth daily. Facility-Administered Medications: None       Allergies   Allergen Reactions    Gabapentin Other (comments)     \"causes me to stop breathing. \"       Lyrica [Pregabalin] Hives    Niacin Hives    Nystatin Itching    Percocet [Oxycodone-Acetaminophen] Unknown (comments)       FAMILY HISTORY: Reviewed.  Negative except   Family History   Problem Relation Age of Onset    Hypertension Mother     Diabetes Mother     Heart Attack Father     Cancer Brother         colon    Other Sister         brain aneurysm       Social History     Tobacco Use    Smoking status: Never Smoker    Smokeless tobacco: Never Used   Substance Use Topics    Alcohol use: Never     Frequency: Never         Objective: Visit Vitals  BP (!) 176/67   Pulse 75   Temp 97.9 °F (36.6 °C)   Resp 18   Ht 5' 9\" (1.753 m)   Wt (!) 191 kg (421 lb)   SpO2 95%   BMI 62.17 kg/m²      Temp (24hrs), Av.9 °F (36.6 °C), Min:97.9 °F (36.6 °C), Max:97.9 °F (36.6 °C)    Oxygen Therapy  O2 Sat (%): 95 % (20 0019)  Pulse via Oximetry: 75 beats per minute (20 0019)  O2 Device: Room air (20 2842)  O2 Flow Rate (L/min): 2 l/min (20 1909)  Physical Exam:  General:    The patient is a pleasant middle aged female in no acute distress. Head:   Normocephalic/atraumatic. Eyes:  No palpebral pallor or scleral icterus. ENT:  External auricular and nasal exam within normal limits. Mucous membranes are moist.  Neck:  Supple, non-tender, no JVD. Lungs:   Clear to auscultation bilaterally without wheezes or crackles. No respiratory distress or accessory muscle use. Heart:   Regular rate and rhythm, without murmurs, rubs, or gallops. Abdomen:   Soft, non-tender, non-distended with normoactive bowel sounds. Genitourinary: No tenderness over the bladder or bilateral CVAs. Extremities: Without clubbing, cyanosis, or edema. Skin:     Normal color, texture, and turgor. No rashes, lesions, or jaundice. Pulses: Radial and dorsalis pedis pulses present 2+ bilaterally. Capillary refill <2s. Neurologic: CN II-XII grossly intact and symmetrical.     Moving all four extremities well with no focal deficits. Psychiatric: Pleasant demeanor, appropriate affect.  Alert and oriented x 3    Data Review:   Recent Results (from the past 24 hour(s))   CBC WITH AUTOMATED DIFF    Collection Time: 20  1:36 PM   Result Value Ref Range    WBC 11.0 4.3 - 11.1 K/uL    RBC 4.28 4.05 - 5.2 M/uL    HGB 12.5 11.7 - 15.4 g/dL    HCT 40.7 35.8 - 46.3 %    MCV 95.1 79.6 - 97.8 FL    MCH 29.2 26.1 - 32.9 PG    MCHC 30.7 (L) 31.4 - 35.0 g/dL    RDW 14.3 11.9 - 14.6 %    PLATELET 395 053 - 875 K/uL    MPV 10.4 9.4 - 12.3 FL    ABSOLUTE NRBC 0. 00 0.0 - 0.2 K/uL    DF AUTOMATED      NEUTROPHILS 69 43 - 78 %    LYMPHOCYTES 24 13 - 44 %    MONOCYTES 5 4.0 - 12.0 %    EOSINOPHILS 1 0.5 - 7.8 %    BASOPHILS 1 0.0 - 2.0 %    IMMATURE GRANULOCYTES 1 0.0 - 5.0 %    ABS. NEUTROPHILS 7.6 1.7 - 8.2 K/UL    ABS. LYMPHOCYTES 2.6 0.5 - 4.6 K/UL    ABS. MONOCYTES 0.6 0.1 - 1.3 K/UL    ABS. EOSINOPHILS 0.1 0.0 - 0.8 K/UL    ABS. BASOPHILS 0.1 0.0 - 0.2 K/UL    ABS. IMM. GRANS. 0.1 0.0 - 0.5 K/UL   METABOLIC PANEL, COMPREHENSIVE    Collection Time: 11/17/20  1:36 PM   Result Value Ref Range    Sodium 142 136 - 145 mmol/L    Potassium 3.9 3.5 - 5.1 mmol/L    Chloride 107 98 - 107 mmol/L    CO2 27 21 - 32 mmol/L    Anion gap 8 7 - 16 mmol/L    Glucose 139 (H) 65 - 100 mg/dL    BUN 19 6 - 23 MG/DL    Creatinine 0.94 0.6 - 1.0 MG/DL    GFR est AA >60 >60 ml/min/1.73m2    GFR est non-AA >60 >60 ml/min/1.73m2    Calcium 8.8 8.3 - 10.4 MG/DL    Bilirubin, total 1.1 0.2 - 1.1 MG/DL    ALT (SGPT) 19 12 - 65 U/L    AST (SGOT) 10 (L) 15 - 37 U/L    Alk.  phosphatase 150 (H) 50 - 136 U/L    Protein, total 7.8 6.3 - 8.2 g/dL    Albumin 3.1 (L) 3.5 - 5.0 g/dL    Globulin 4.7 (H) 2.3 - 3.5 g/dL    A-G Ratio 0.7 (L) 1.2 - 3.5     TROPONIN-HIGH SENSITIVITY    Collection Time: 11/17/20  1:36 PM   Result Value Ref Range    Troponin-High Sensitivity 14.9 (H) 0 - 14 pg/mL   NT-PRO BNP    Collection Time: 11/17/20  1:36 PM   Result Value Ref Range    NT pro- (H) 5 - 125 PG/ML   EKG, 12 LEAD, INITIAL    Collection Time: 11/17/20  1:37 PM   Result Value Ref Range    Ventricular Rate 77 BPM    Atrial Rate 77 BPM    P-R Interval 142 ms    QRS Duration 94 ms    Q-T Interval 400 ms    QTC Calculation (Bezet) 452 ms    Calculated P Axis 54 degrees    Calculated R Axis 43 degrees    Calculated T Axis 35 degrees    Diagnosis       Normal sinus rhythm  Normal ECG  When compared with ECG of 17-NOV-2020 13:37,  No significant change was found  Confirmed by Benja Lozada MD ()ARAMIS ([id number]) on 11/17/2020 3:25:08 PM     TROPONIN-HIGH SENSITIVITY    Collection Time: 11/17/20  4:11 PM   Result Value Ref Range    Troponin-High Sensitivity 12.9 0 - 14 pg/mL   SARS-COV-2    Collection Time: 11/17/20  8:44 PM   Result Value Ref Range    Specimen source Nasopharyngeal      COVID-19 rapid test Not detected NOTD      SARS CoV-2 PENDING        Imaging /Procedures /Studies:  Ct Chest W Cont    Result Date: 11/17/2020  IMPRESSION: 1. No evidence of pulmonary embolism. 2. Cardiomegaly. Xr Chest Port    Result Date: 11/17/2020  IMPRESSION: 1. No acute cardiopulmonary abnormality. Assessment and Plan:     Principal Problem:    Asthma exacerbation    Vs. Acute bronchitis. Duoneb q4h, IV Solumedrol. Not currently requiring O2. Active Problems:    Edema (10/20/2017)    Mild, no evidence of pulmonary edema on CXR. Chronic back pain (7/28/2013)    Stable. Continue home meds. Diabetes mellitus ([address] (7/29/2019)    Stable. Continue home meds. SSI      Benign essential hypertension (7/28/2013)    Stable. Continue home meds. Coronary arteriosclerosis (7/29/2019)    Stable. Continue home meds. Chronic deep vein thrombosis (DVT) of lower extremity (HCC) (7/29/2019)    Stable. Continue home meds. Morbid obesity ([address] (9/11/2013)    Stable. Anxiety (7/29/2019)    Stable. Continue home meds. DVT Prophylaxis: Lovenox      Code Status: FULL CODE      Disposition: Observe on med/surg for evaluation and treatment as per above.       Anticipated discharge: <2 days     Signed By: Bri Hernandez MD     November 18, 2020

## 2020-11-19 ENCOUNTER — PATIENT OUTREACH (OUTPATIENT)
Dept: CASE MANAGEMENT | Age: 55
End: 2020-11-19

## 2020-11-19 LAB
COVID-19 RAPID TEST, COVR: NOT DETECTED
SARS COV-2, XPGCVT: NEGATIVE
SOURCE, COVRS: NORMAL

## 2020-11-19 NOTE — PROGRESS NOTES
CTN attempted to outreach to patient per hospital discharge 11/18/2020 for [facility] call. Unable to reach patient. Message left with contact information requesting a return call. Will continue to outreach per protocol.

## 2020-11-20 ENCOUNTER — PATIENT OUTREACH (OUTPATIENT)
Dept: CASE MANAGEMENT | Age: 55
End: 2020-11-20

## 2020-11-20 NOTE — PROGRESS NOTES
2nd attempt to outreach to patient for [facility] call. Another message left with contact information requesting a return call. Episode will be resolved at this time due to no return call.

## 2020-11-23 PROBLEM — F41.8 DEPRESSION WITH ANXIETY: Status: ACTIVE | Noted: 2019-09-30

## 2020-12-02 ENCOUNTER — TELEPHONE (OUTPATIENT)
Dept: DIABETES SERVICES | Age: 55
End: 2020-12-02

## 2020-12-02 NOTE — TELEPHONE ENCOUNTER
Pt had a 2 hr yearly diabetes education session scheduled today for 2:30pm. I had a no show so I called pt's contact number which was her daughter's number to see if they could come at an earlier time. Daughter informed RD that pt was having chest pains and was scheduled for a heart cath 12/3/20 and would not be coming to her appointment today. Daughter wants diabetes center to call in approximately one month to see about r/s.

## 2020-12-02 NOTE — TELEPHONE ENCOUNTER
Chart reviewed, seen by multiple providers including ED for this complaint with plan of care in place

## 2020-12-07 NOTE — THERAPY DISCHARGE
Lida Covert  : 1965  Primary: [employer]  Secondary: [place] [facility] [id number] Therapy  [address] [place] [address], [address]  [phone]   [phone]         OUTPATIENT OCCUPATIONAL THERAPY: Discharge 2020    ICD-10: Treatment Diagnosis: I89.0, Lymphedema not elsewhere classified  I89.022, Lymphedema with obesity  I89.026, Lymphedema with diabetes  Precautions/Allergies:   Gabapentin; Lyrica [pregabalin]; Niacin; Nystatin; and Percocet [oxycodone-acetaminophen]   MD Orders: eval and treat MEDICAL/REFERRING DIAGNOSIS:   Edema, unspecified [R60.9]  Localized edema [R60.0]   DATE OF ONSET: Chronic   REFERRING PHYSICIAN: Can Jerome*  RETURN PHYSICIAN APPOINTMENT: to be determined    DISCHARGE 2020:  Ms. Paul Zhao competed 2 lymphedema therapy sessions from 10/21 through 2020. Pt was educated on lymphatic pathophysiology, complete decongestive therapy, precautions and skin integrity management. She received compression bandaging and her daughter was instructed in bandaging for follow through. Pt did not return after the second session. She is discharged from therapy. INITIAL ASSESSMENT:  Ms. Paul Zhao presents with leg swelling that has been progressively getting worse \"for years. \"  Ms. Paul Zhao presents with bilateral LE 2+ pitting edema, hyperpigmentation, mild lymphostatic fibrosis, positive Stemmer sign. She report her pain stays around 6/10 but shoots up to 10/10 with sitting too long. She is on disability for neck and back problems, since . She lives with her daughters and requires moderate assistance to complete ADLs and IADLs. She receives CLTC services in the home. She arrived in a wheelchair due to severe shortness of breath and difficulty walking back to the treatment area.   She recently had a cardiac work up and reports her cardiac status is good and the swelling in her legs is not related to her heart. Ms. Latha Rodas spends most of the day in bed. She is diabetic as well. Ms. Latha Rodas will benefit from complete decongestive therapy, light exercise and compression bandaging to reduce leg swelling. Plan to transition to compression garments to manage reduction achieved in therapy. Ms. Latha Rodas is in agreement with POC and goals. PLAN OF CARE:   PROBLEM LIST:  1. Increased Pain  2. Decreased Activity Tolerance  3. Edema/Girth  4. Decreased Knowledge of Precautions  5. Decreased Skin Integrity/Hygeine  6. Decreased [place] with Home Exercise Program INTERVENTIONS PLANNED  1. Skin care  2. Compression bandaging  3. Fitting for compression garment(s)  4. Manual therapy/Manual lymph drainage  5. Therapeutic exercise/Therapeutic activities  6. Patient Education  7. Compression pump trial prn  8.  kinesiotaping    TREATMENT PLAN:  Effective Dates: 10/21/2020 TO 1/19/2021 (90 days). Frequency/Duration: 2 times a week for 90 Day(s)  Will adjust frequency and duration as progress indicates. GOALS: (Goals have been discussed and agreed upon with patient.)  Short-Term Functional Goals: Time Frame: 45 days  1. The patient/caregiver will verbalize understanding of lymphedema precautions. Goal met  2. Patient will be independent with skin care regimen to decrease risk of cellulitis. Goal met  3. The patient/caregiver will be independent at donning and doffing  lower extremity compression bandages. Goal met  4. The patient/caregiver will be independent with self-manual lymph drainage techniques and show decrease in limb volume. Not met  5. Patient will be independent in lymphatic exercises. Not met    Discharge Goals: Time Frame: 90 days  1. Patient's bilateral lower extremity circumferential measurements will decrease 5 cm or greater on volumetric graph to maximize functional use in ADL's. Not met  2.  The patient/caregiver will be independent with home management of lymphedema. Not met  3. Patient/caregiver will be independent donning and doffing bilateral lower extremity compression garment.   Not met

## 2020-12-08 PROBLEM — R07.9 CHEST PAIN: Status: ACTIVE | Noted: 2020-11-22

## 2020-12-08 PROBLEM — I50.30 (HFPEF) HEART FAILURE WITH PRESERVED EJECTION FRACTION (HCC): Status: ACTIVE | Noted: 2020-11-22

## 2020-12-08 PROBLEM — R94.39 ABNORMAL STRESS TEST: Status: ACTIVE | Noted: 2020-11-27

## 2020-12-08 PROBLEM — I82.409 DVT (DEEP VENOUS THROMBOSIS) (HCC): Status: ACTIVE | Noted: 2020-11-22

## 2020-12-08 PROBLEM — Z86.718 HISTORY OF DVT (DEEP VEIN THROMBOSIS): Status: ACTIVE | Noted: 2020-12-08

## 2020-12-21 ENCOUNTER — HOSPITAL ENCOUNTER (OUTPATIENT)
Dept: GENERAL RADIOLOGY | Age: 55
Discharge: HOME | End: 2020-12-21
Payer: MEDICARE

## 2020-12-21 DIAGNOSIS — J45.909 UNCOMPLICATED ASTHMA, UNSPECIFIED ASTHMA SEVERITY, UNSPECIFIED WHETHER PERSISTENT: ICD-10-CM

## 2020-12-21 PROCEDURE — 71046 X-RAY EXAM CHEST 2 VIEWS: CPT

## 2021-02-17 PROBLEM — U07.1 PNEUMONIA DUE TO COVID-19 VIRUS: Status: ACTIVE | Noted: 2020-12-12

## 2021-02-17 PROBLEM — R07.89 OTHER CHEST PAIN: Status: ACTIVE | Noted: 2020-11-22

## 2021-02-17 PROBLEM — J12.82 PNEUMONIA DUE TO COVID-19 VIRUS: Status: ACTIVE | Noted: 2020-12-12

## 2021-03-09 PROBLEM — E11.3293 NON-PROLIFERATIVE DIABETIC RETINOPATHY, BOTH EYES (HCC): Status: ACTIVE | Noted: 2021-03-09

## 2021-03-09 PROBLEM — I26.99 ACUTE PULMONARY EMBOLISM WITHOUT ACUTE COR PULMONALE (HCC): Status: ACTIVE | Noted: 2020-12-30

## 2021-03-09 PROBLEM — R32 URINARY INCONTINENCE: Status: ACTIVE | Noted: 2021-03-09

## 2021-03-12 ENCOUNTER — HOSPITAL ENCOUNTER (OUTPATIENT)
Dept: LAB | Age: 56
Discharge: HOME | End: 2021-03-12
Payer: MEDICARE

## 2021-03-12 DIAGNOSIS — I50.32 CHRONIC DIASTOLIC CONGESTIVE HEART FAILURE (HCC): ICD-10-CM

## 2021-03-12 PROBLEM — I25.10 CORONARY ARTERIOSCLEROSIS: Status: RESOLVED | Noted: 2019-07-29 | Resolved: 2021-03-12

## 2021-03-12 LAB
ANION GAP SERPL CALC-SCNC: 5 MMOL/L (ref 7–16)
BNP SERPL-MCNC: 314 PG/ML (ref 5–125)
BUN SERPL-MCNC: 30 MG/DL (ref 6–23)
CALCIUM SERPL-MCNC: 8.9 MG/DL (ref 8.3–10.4)
CHLORIDE SERPL-SCNC: 103 MMOL/L (ref 98–107)
CO2 SERPL-SCNC: 31 MMOL/L (ref 21–32)
CREAT SERPL-MCNC: 1.21 MG/DL (ref 0.6–1)
GLUCOSE SERPL-MCNC: 153 MG/DL (ref 65–100)
POTASSIUM SERPL-SCNC: 3.7 MMOL/L (ref 3.5–5.1)
SODIUM SERPL-SCNC: 139 MMOL/L (ref 136–145)

## 2021-03-12 PROCEDURE — 36415 COLL VENOUS BLD VENIPUNCTURE: CPT

## 2021-03-12 PROCEDURE — 80048 BASIC METABOLIC PNL TOTAL CA: CPT

## 2021-03-12 PROCEDURE — 83880 ASSAY OF NATRIURETIC PEPTIDE: CPT

## 2021-03-14 PROBLEM — R94.39 ABNORMAL STRESS TEST: Status: RESOLVED | Noted: 2020-11-27 | Resolved: 2021-03-14

## 2021-08-13 ENCOUNTER — HOSPITAL ENCOUNTER (OUTPATIENT)
Dept: DIABETES SERVICES | Age: 56
Discharge: HOME | End: 2021-08-13
Payer: MEDICARE

## 2021-08-13 PROCEDURE — G0108 DIAB MANAGE TRN  PER INDIV: HCPCS

## 2021-08-13 NOTE — PROGRESS NOTES
This is a one on one appointment. Due to being during EWQZI-18 public health emergency, social distancing and mandatory precautions are in place and utilized. Client attended two hour yearly follow up session today. Topics discussed were client driven. Topics included carbohydrates, fiber, weight loss, meal planning, sugar substitutes, recipe modification, eating out, portion control, sodium, Hgb A1C, blood sugar target ranges, CGM, foot care. Written materials provided. Encouraged pt to attend the free grocery shopping tour. Diet: 2-3 meals, 0-1 snack. May skip breakfast. Encouraged pt to add a 7 gram protein to her snack and discussed how the liver can dump sugar when meals are skipped. Affirmed adequate water intake. Diet recall suggests lower carbohydrates at meals. Exercise: Limited activity. In wheelchair. Pt contemplating water therapy or exercise. Provided CDE name and phone number for f/u questions. Hpi Title: Evaluation of Skin Lesions How Severe Are Your Spot(S)?: mild Have Your Spot(S) Been Treated In The Past?: has not been treated

## 2021-10-05 ENCOUNTER — HOSPITAL ENCOUNTER (EMERGENCY)
Age: 56
Discharge: HOME | End: 2021-10-06
Attending: STUDENT IN AN ORGANIZED HEALTH CARE EDUCATION/TRAINING PROGRAM
Payer: MEDICARE

## 2021-10-05 ENCOUNTER — APPOINTMENT (OUTPATIENT)
Dept: GENERAL RADIOLOGY | Age: 56
End: 2021-10-05
Attending: EMERGENCY MEDICINE
Payer: MEDICARE

## 2021-10-05 DIAGNOSIS — R07.9 CHEST PAIN, UNSPECIFIED TYPE: Primary | ICD-10-CM

## 2021-10-05 LAB
ALBUMIN SERPL-MCNC: 2.8 G/DL (ref 3.5–5)
ALBUMIN/GLOB SERPL: 0.6 {RATIO} (ref 1.2–3.5)
ALP SERPL-CCNC: 160 U/L (ref 50–136)
ALT SERPL-CCNC: 20 U/L (ref 12–65)
ANION GAP SERPL CALC-SCNC: 6 MMOL/L (ref 7–16)
AST SERPL-CCNC: 8 U/L (ref 15–37)
BASOPHILS # BLD: 0.1 K/UL (ref 0–0.2)
BASOPHILS NFR BLD: 0 % (ref 0–2)
BILIRUB SERPL-MCNC: 1.1 MG/DL (ref 0.2–1.1)
BNP SERPL-MCNC: 259 PG/ML (ref 5–125)
BUN SERPL-MCNC: 23 MG/DL (ref 6–23)
CALCIUM SERPL-MCNC: 9 MG/DL (ref 8.3–10.4)
CHLORIDE SERPL-SCNC: 104 MMOL/L (ref 98–107)
CO2 SERPL-SCNC: 30 MMOL/L (ref 21–32)
CREAT SERPL-MCNC: 1.32 MG/DL (ref 0.6–1)
DIFFERENTIAL METHOD BLD: ABNORMAL
EOSINOPHIL # BLD: 0.1 K/UL (ref 0–0.8)
EOSINOPHIL NFR BLD: 1 % (ref 0.5–7.8)
ERYTHROCYTE [DISTWIDTH] IN BLOOD BY AUTOMATED COUNT: 14.5 % (ref 11.9–14.6)
GLOBULIN SER CALC-MCNC: 5 G/DL (ref 2.3–3.5)
GLUCOSE SERPL-MCNC: 201 MG/DL (ref 65–100)
HCT VFR BLD AUTO: 43.1 % (ref 35.8–46.3)
HGB BLD-MCNC: 13.1 G/DL (ref 11.7–15.4)
IMM GRANULOCYTES # BLD AUTO: 0 K/UL (ref 0–0.5)
IMM GRANULOCYTES NFR BLD AUTO: 0 % (ref 0–5)
LIPASE SERPL-CCNC: 224 U/L (ref 73–393)
LYMPHOCYTES # BLD: 3.4 K/UL (ref 0.5–4.6)
LYMPHOCYTES NFR BLD: 28 % (ref 13–44)
MAGNESIUM SERPL-MCNC: 2.1 MG/DL (ref 1.8–2.4)
MCH RBC QN AUTO: 29.4 PG (ref 26.1–32.9)
MCHC RBC AUTO-ENTMCNC: 30.4 G/DL (ref 31.4–35)
MCV RBC AUTO: 96.9 FL (ref 79.6–97.8)
MONOCYTES # BLD: 0.5 K/UL (ref 0.1–1.3)
MONOCYTES NFR BLD: 4 % (ref 4–12)
NEUTS SEG # BLD: 8 K/UL (ref 1.7–8.2)
NEUTS SEG NFR BLD: 66 % (ref 43–78)
NRBC # BLD: 0 K/UL (ref 0–0.2)
PLATELET # BLD AUTO: 355 K/UL (ref 150–450)
PMV BLD AUTO: 10.1 FL (ref 9.4–12.3)
POTASSIUM SERPL-SCNC: 3.9 MMOL/L (ref 3.5–5.1)
PROT SERPL-MCNC: 7.8 G/DL (ref 6.3–8.2)
RBC # BLD AUTO: 4.45 M/UL (ref 4.05–5.2)
SODIUM SERPL-SCNC: 140 MMOL/L (ref 136–145)
TROPONIN-HIGH SENSITIVITY: 9.6 PG/ML (ref 0–14)
WBC # BLD AUTO: 12 K/UL (ref 4.3–11.1)

## 2021-10-05 PROCEDURE — 93005 ELECTROCARDIOGRAM TRACING: CPT | Performed by: STUDENT IN AN ORGANIZED HEALTH CARE EDUCATION/TRAINING PROGRAM

## 2021-10-05 PROCEDURE — 93005 ELECTROCARDIOGRAM TRACING: CPT | Performed by: EMERGENCY MEDICINE

## 2021-10-05 PROCEDURE — 83735 ASSAY OF MAGNESIUM: CPT

## 2021-10-05 PROCEDURE — 80053 COMPREHEN METABOLIC PANEL: CPT

## 2021-10-05 PROCEDURE — 99285 EMERGENCY DEPT VISIT HI MDM: CPT

## 2021-10-05 PROCEDURE — 83690 ASSAY OF LIPASE: CPT

## 2021-10-05 PROCEDURE — 84484 ASSAY OF TROPONIN QUANT: CPT

## 2021-10-05 PROCEDURE — 85025 COMPLETE CBC W/AUTO DIFF WBC: CPT

## 2021-10-05 PROCEDURE — 71046 X-RAY EXAM CHEST 2 VIEWS: CPT

## 2021-10-05 PROCEDURE — 96374 THER/PROPH/DIAG INJ IV PUSH: CPT

## 2021-10-05 PROCEDURE — 83880 ASSAY OF NATRIURETIC PEPTIDE: CPT

## 2021-10-05 RX ORDER — GUAIFENESIN 100 MG/5ML
324 LIQUID (ML) ORAL
Status: COMPLETED | OUTPATIENT
Start: 2021-10-05 | End: 2021-10-06

## 2021-10-05 RX ORDER — SODIUM CHLORIDE 0.9 % (FLUSH) 0.9 %
5-10 SYRINGE (ML) INJECTION EVERY 8 HOURS
Status: DISCONTINUED | OUTPATIENT
Start: 2021-10-05 | End: 2021-10-06 | Stop reason: HOSPADM

## 2021-10-05 RX ORDER — SODIUM CHLORIDE 0.9 % (FLUSH) 0.9 %
5-10 SYRINGE (ML) INJECTION AS NEEDED
Status: DISCONTINUED | OUTPATIENT
Start: 2021-10-05 | End: 2021-10-06 | Stop reason: HOSPADM

## 2021-10-05 RX ORDER — NITROGLYCERIN 0.4 MG/1
0.4 TABLET SUBLINGUAL
Status: DISCONTINUED | OUTPATIENT
Start: 2021-10-05 | End: 2021-10-06 | Stop reason: HOSPADM

## 2021-10-06 ENCOUNTER — APPOINTMENT (OUTPATIENT)
Dept: CT IMAGING | Age: 56
End: 2021-10-06
Attending: STUDENT IN AN ORGANIZED HEALTH CARE EDUCATION/TRAINING PROGRAM
Payer: MEDICARE

## 2021-10-06 VITALS
WEIGHT: 293 LBS | DIASTOLIC BLOOD PRESSURE: 72 MMHG | TEMPERATURE: 97.9 F | BODY MASS INDEX: 43.4 KG/M2 | SYSTOLIC BLOOD PRESSURE: 144 MMHG | HEART RATE: 74 BPM | HEIGHT: 69 IN | RESPIRATION RATE: 18 BRPM | OXYGEN SATURATION: 98 %

## 2021-10-06 LAB
ATRIAL RATE: 83 BPM
CALCULATED P AXIS, ECG09: 39 DEGREES
CALCULATED R AXIS, ECG10: 25 DEGREES
CALCULATED T AXIS, ECG11: 1 DEGREES
COVID-19 RAPID TEST, COVR: NOT DETECTED
D DIMER PPP FEU-MCNC: 0.63 UG/ML(FEU)
DIAGNOSIS, 93000: NORMAL
P-R INTERVAL, ECG05: 132 MS
Q-T INTERVAL, ECG07: 390 MS
QRS DURATION, ECG06: 98 MS
QTC CALCULATION (BEZET), ECG08: 458 MS
SARS-COV-2, COV2: NORMAL
SOURCE, COVRS: NORMAL
TROPONIN-HIGH SENSITIVITY: 9.6 PG/ML (ref 0–14)
VENTRICULAR RATE, ECG03: 83 BPM

## 2021-10-06 PROCEDURE — 74011250637 HC RX REV CODE- 250/637: Performed by: STUDENT IN AN ORGANIZED HEALTH CARE EDUCATION/TRAINING PROGRAM

## 2021-10-06 PROCEDURE — 74011250636 HC RX REV CODE- 250/636: Performed by: STUDENT IN AN ORGANIZED HEALTH CARE EDUCATION/TRAINING PROGRAM

## 2021-10-06 PROCEDURE — 85379 FIBRIN DEGRADATION QUANT: CPT

## 2021-10-06 PROCEDURE — 74011000636 HC RX REV CODE- 636: Performed by: STUDENT IN AN ORGANIZED HEALTH CARE EDUCATION/TRAINING PROGRAM

## 2021-10-06 PROCEDURE — 74011000258 HC RX REV CODE- 258: Performed by: STUDENT IN AN ORGANIZED HEALTH CARE EDUCATION/TRAINING PROGRAM

## 2021-10-06 PROCEDURE — 71260 CT THORAX DX C+: CPT

## 2021-10-06 PROCEDURE — 84484 ASSAY OF TROPONIN QUANT: CPT

## 2021-10-06 PROCEDURE — 87635 SARS-COV-2 COVID-19 AMP PRB: CPT

## 2021-10-06 RX ORDER — SODIUM CHLORIDE 0.9 % (FLUSH) 0.9 %
10 SYRINGE (ML) INJECTION
Status: COMPLETED | OUTPATIENT
Start: 2021-10-06 | End: 2021-10-06

## 2021-10-06 RX ORDER — FUROSEMIDE 10 MG/ML
60 INJECTION INTRAMUSCULAR; INTRAVENOUS
Status: COMPLETED | OUTPATIENT
Start: 2021-10-06 | End: 2021-10-06

## 2021-10-06 RX ADMIN — SODIUM CHLORIDE 500 ML: 900 INJECTION, SOLUTION INTRAVENOUS at 01:23

## 2021-10-06 RX ADMIN — Medication 10 ML: at 02:01

## 2021-10-06 RX ADMIN — SODIUM CHLORIDE 100 ML: 900 INJECTION, SOLUTION INTRAVENOUS at 02:01

## 2021-10-06 RX ADMIN — NITROGLYCERIN 0.4 MG: 0.4 TABLET SUBLINGUAL at 04:10

## 2021-10-06 RX ADMIN — FUROSEMIDE 60 MG: 10 INJECTION, SOLUTION INTRAMUSCULAR; INTRAVENOUS at 00:41

## 2021-10-06 RX ADMIN — IOPAMIDOL 100 ML: 755 INJECTION, SOLUTION INTRAVENOUS at 02:00

## 2021-10-06 RX ADMIN — ASPIRIN 324 MG: 81 TABLET, CHEWABLE ORAL at 00:03

## 2021-10-06 RX ADMIN — NITROGLYCERIN 0.4 MG: 0.4 TABLET SUBLINGUAL at 01:37

## 2021-10-06 NOTE — ED PROVIDER NOTES
58-year-old female patient with history of heart failure presents to the emergency department with reports of intermittent bouts of chest pain over the left chest radiating to the left neck and left arm. Patient states the symptoms have been intermittent over the course of the day. Her most recent episode occurred at 10 PM this evening while laying down to go to sleep. She reports ongoing pressure-like sensation in the left chest at this time. She denies cough or congestion but does report some occasional chills. She denies any quantified fever. She reports no nausea, vomiting or diaphoresis. She denies any obvious alleviating or exacerbating factors though states her shortness of breath seems to worsen with exertion. She is on furosemide prescribed by her cardiologist and increased her dose to twice daily today. She has not urinated as much as she normally does with an increased dose of this medication. Patient also endorses increased weight gain as well. She has noted elevated blood pressures and recently discovered that she was out of 2 blood pressure medicines including carvedilol and what she thinks is HCTZ or hydralazine. She is unsure of exactly how long she has been out of these medications.            Past Medical History:   Diagnosis Date    Asthma     CAD (coronary artery disease)     mild non-obstuctive CAD cath 08/12/19 echo 08/13/19EF 55-60%    Chronic pain     Diabetes ([address]     avg 156, s/s hypo 62    Hypertension     Ill-defined condition     chronic back problems    Morbid obesity ([address]     Thromboembolus ([address] 2019    dvt x2 right leg       Past Surgical History:   Procedure Laterality Date    COLONOSCOPY N/A 11/4/2019    COLONOSCOPY/ BMI 59 performed by Umm Orosco MD at [address] [facility] HX CERVICAL FUSION  2019    HX [address]    HX CHOLECYSTECTOMY      HX GI      hernia repair    HX HEART CATHETERIZATION      no stent    HX HERNIA REPAIR      HX ORTHOPAEDIC Right 2012    trigger-finger release         Family History:   Problem Relation Age of Onset    Hypertension Mother     Diabetes Mother     Heart Attack Father     Cancer Brother         colon    Other Sister         brain aneurysm       Social History     Socioeconomic History    Marital status:      Spouse name: Not on file    Number of children: Not on file    Years of education: Not on file    Highest education level: Not on file   Occupational History    Not on file   Tobacco Use    Smoking status: Never Smoker    Smokeless tobacco: Never Used   Substance and Sexual Activity    Alcohol use: Never    Drug use: Never    Sexual activity: Not on file   Other Topics Concern    Not on file   Social History Narrative    Not on file     Social Determinants of Health     Financial Resource Strain:     Difficulty of Paying Living Expenses:    Food Insecurity:     Worried About Running Out of Food in the Last Year:     [address] in the Last Year:    Transportation Needs:     Lack of Transportation (Medical):  Lack of Transportation (Non-Medical):    Physical Activity:     Days of Exercise per Week:     Minutes of Exercise per Session:    Stress:     Feeling of Stress :    Social Connections:     Frequency of Communication with Friends and Family:     Frequency of Social Gatherings with Friends and Family:     Attends Quaker Services:     Active Member of Clubs or Organizations:     Attends Club or Organization Meetings:     Marital Status:    Intimate Partner Violence:     Fear of Current or Ex-Partner:     Emotionally Abused:     Physically Abused:     Sexually Abused: ALLERGIES: Gabapentin, Lyrica [pregabalin], Niacin, Nystatin, and Percocet [oxycodone-acetaminophen]    Review of Systems   Constitutional: Negative for chills, diaphoresis and fever. HENT: Negative for congestion, sneezing and sore throat.     Eyes: Negative for visual disturbance. Respiratory: Positive for chest tightness and shortness of breath. Negative for cough and wheezing. Cardiovascular: Negative for chest pain and leg swelling. Gastrointestinal: Negative for abdominal pain, blood in stool, diarrhea, nausea and vomiting. Endocrine: Negative for polyuria. Genitourinary: Negative for difficulty urinating, dysuria, flank pain, hematuria and urgency. Musculoskeletal: Negative for back pain, myalgias, neck pain and neck stiffness. Skin: Negative for color change and rash. Neurological: Negative for dizziness, syncope, speech difficulty, weakness, light-headedness, numbness and headaches. Psychiatric/Behavioral: Negative for behavioral problems. All other systems reviewed and are negative. Vitals:    10/05/21 2201   BP: 108/69   Pulse: 88   Resp: 22   Temp: 97.8 °F (36.6 °C)   SpO2: 94%   Weight: (!) 183.7 kg (405 lb)   Height: 5' 9\" (1.753 m)            Physical Exam  Vitals and nursing note reviewed. Constitutional:       General: She is not in acute distress. Appearance: She is well-developed. She is not diaphoretic. Comments: Alert and oriented to person place and time. No acute distress, speaks in clear, fluid sentences. HENT:      Head: Normocephalic and atraumatic. Right Ear: External ear normal.      Left Ear: External ear normal.      Nose: Nose normal.   Eyes:      Pupils: Pupils are equal, round, and reactive to light. Cardiovascular:      Rate and Rhythm: Normal rate and regular rhythm. Heart sounds: Normal heart sounds. No murmur heard. No friction rub. No gallop. Pulmonary:      Effort: Pulmonary effort is normal. No respiratory distress. Breath sounds: No stridor. Decreased breath sounds present. No wheezing, rhonchi or rales. Comments: Diminished throughout, likely secondary in part to body habitus. No focal findings.     Visible exertional dyspnea when patient is ambulating to the stretcher. Chest:      Chest wall: No tenderness. Abdominal:      General: There is no distension. Palpations: Abdomen is soft. There is no mass. Tenderness: There is no abdominal tenderness. There is no guarding or rebound. Hernia: No hernia is present. Musculoskeletal:         General: No tenderness or deformity. Normal range of motion. Cervical back: Normal range of motion. Skin:     General: Skin is warm and dry. Neurological:      Mental Status: She is alert and oriented to person, place, and time. Cranial Nerves: No cranial nerve deficit. MDM  Number of Diagnoses or Management Options  Diagnosis management comments: Patient does report a history of DVT, currently on Eliquis per her report. Amount and/or Complexity of Data Reviewed  Clinical lab tests: ordered and reviewed  Tests in the radiology section of CPT®: ordered and reviewed  Tests in the medicine section of CPT®: ordered and reviewed  Independent visualization of images, tracings, or specimens: yes    Risk of Complications, Morbidity, and/or Mortality  Presenting problems: moderate  Diagnostic procedures: low  Management options: moderate    Patient Progress  Patient progress: stable    ED Course as of Oct 05 2334   Tue Oct 05, 2021   2300 EKG interpretation: Sinus rhythm, rate of 83 beats a minute, normal axis, inversion of T wave segment in lead III, no obvious ischemia.     [BR]      ED Course User Index  [BR] Roland Don, DO       Procedures

## 2021-10-06 NOTE — DISCHARGE INSTRUCTIONS
Although your evaluation today has been normal, you require close outpatient follow-up with a cardiology provider. This appointment has been arranged and you should be contacted by this provider within the next several days to schedule. If you do not receive this call, please call the number listed. Return to this department for worsening symptoms, concerns or questions. Continue taking your torsemide twice daily as discussed for the next 4 days.

## 2021-10-06 NOTE — ED TRIAGE NOTES
Arrives with face mask in place. Ambulatory into triage with reports left sided neck pain radiating down left arm and into left side of chest. Onset approx 2000 while lying in bed. Associated shortness of breath, chills, diarrhea. Denies cough, fevers, n/v. States pain to chest intermittent since onset and describes as \"pressure\". Reports hx CHF and has been retaining fluid. Attempted to contact PMD this AM. Did not receive return phone call.

## 2021-10-06 NOTE — ED NOTES
I have reviewed discharge instructions with the patient. The patient verbalized understanding. Patient instructed to follow-up with cardiologist to discuss increasing toresmide for increased swelling and SOB. Patient left ED via Discharge Method: stretcher to Home with transport. Opportunity for questions and clarification provided. Patient given 0 scripts. To continue your aftercare when you leave the hospital, you may receive an automated call from our care team to check in on how you are doing. This is a free service and part of our promise to provide the best care and service to meet your aftercare needs.  If you have questions, or wish to unsubscribe from this service please call [phone]. Thank you for Choosing our [address] [place] [address] Emergency Department.

## 2022-03-02 ENCOUNTER — HOSPITAL ENCOUNTER (EMERGENCY)
Age: 57
Discharge: HOME | End: 2022-03-03
Attending: EMERGENCY MEDICINE
Payer: MEDICARE

## 2022-03-02 ENCOUNTER — APPOINTMENT (OUTPATIENT)
Dept: CT IMAGING | Age: 57
End: 2022-03-02
Attending: PHYSICIAN ASSISTANT
Payer: MEDICARE

## 2022-03-02 VITALS
TEMPERATURE: 98.1 F | OXYGEN SATURATION: 98 % | HEIGHT: 69 IN | WEIGHT: 293 LBS | SYSTOLIC BLOOD PRESSURE: 177 MMHG | HEART RATE: 70 BPM | DIASTOLIC BLOOD PRESSURE: 73 MMHG | RESPIRATION RATE: 22 BRPM | BODY MASS INDEX: 43.4 KG/M2

## 2022-03-02 DIAGNOSIS — M54.50 ACUTE BILATERAL LOW BACK PAIN WITHOUT SCIATICA: Primary | ICD-10-CM

## 2022-03-02 LAB
ALBUMIN SERPL-MCNC: 3 G/DL (ref 3.5–5)
ALBUMIN/GLOB SERPL: 0.6 {RATIO} (ref 1.2–3.5)
ALP SERPL-CCNC: 141 U/L (ref 50–136)
ALT SERPL-CCNC: 30 U/L (ref 12–65)
ANION GAP SERPL CALC-SCNC: 6 MMOL/L (ref 7–16)
AST SERPL-CCNC: 29 U/L (ref 15–37)
BASOPHILS # BLD: 0.1 K/UL (ref 0–0.2)
BASOPHILS NFR BLD: 1 % (ref 0–2)
BILIRUB SERPL-MCNC: 1.3 MG/DL (ref 0.2–1.1)
BUN SERPL-MCNC: 28 MG/DL (ref 6–23)
CALCIUM SERPL-MCNC: 9.4 MG/DL (ref 8.3–10.4)
CHLORIDE SERPL-SCNC: 100 MMOL/L (ref 98–107)
CO2 SERPL-SCNC: 31 MMOL/L (ref 21–32)
CREAT SERPL-MCNC: 1.3 MG/DL (ref 0.6–1)
DIFFERENTIAL METHOD BLD: ABNORMAL
EOSINOPHIL # BLD: 0.1 K/UL (ref 0–0.8)
EOSINOPHIL NFR BLD: 1 % (ref 0.5–7.8)
ERYTHROCYTE [DISTWIDTH] IN BLOOD BY AUTOMATED COUNT: 14.3 % (ref 11.9–14.6)
GLOBULIN SER CALC-MCNC: 5 G/DL (ref 2.3–3.5)
GLUCOSE SERPL-MCNC: 205 MG/DL (ref 65–100)
HCT VFR BLD AUTO: 41.8 % (ref 35.8–46.3)
HGB BLD-MCNC: 12.6 G/DL (ref 11.7–15.4)
IMM GRANULOCYTES # BLD AUTO: 0 K/UL (ref 0–0.5)
IMM GRANULOCYTES NFR BLD AUTO: 0 % (ref 0–5)
LIPASE SERPL-CCNC: 215 U/L (ref 73–393)
LYMPHOCYTES # BLD: 2.8 K/UL (ref 0.5–4.6)
LYMPHOCYTES NFR BLD: 27 % (ref 13–44)
MCH RBC QN AUTO: 29.1 PG (ref 26.1–32.9)
MCHC RBC AUTO-ENTMCNC: 30.1 G/DL (ref 31.4–35)
MCV RBC AUTO: 96.5 FL (ref 79.6–97.8)
MONOCYTES # BLD: 0.4 K/UL (ref 0.1–1.3)
MONOCYTES NFR BLD: 4 % (ref 4–12)
NEUTS SEG # BLD: 6.9 K/UL (ref 1.7–8.2)
NEUTS SEG NFR BLD: 66 % (ref 43–78)
NRBC # BLD: 0 K/UL (ref 0–0.2)
PLATELET # BLD AUTO: 336 K/UL (ref 150–450)
PMV BLD AUTO: 10.5 FL (ref 9.4–12.3)
POTASSIUM SERPL-SCNC: 4.1 MMOL/L (ref 3.5–5.1)
PROT SERPL-MCNC: 8 G/DL (ref 6.3–8.2)
RBC # BLD AUTO: 4.33 M/UL (ref 4.05–5.2)
SODIUM SERPL-SCNC: 137 MMOL/L (ref 136–145)
WBC # BLD AUTO: 10.4 K/UL (ref 4.3–11.1)

## 2022-03-02 PROCEDURE — 74011250636 HC RX REV CODE- 250/636: Performed by: PHYSICIAN ASSISTANT

## 2022-03-02 PROCEDURE — 80053 COMPREHEN METABOLIC PANEL: CPT

## 2022-03-02 PROCEDURE — 74176 CT ABD & PELVIS W/O CONTRAST: CPT

## 2022-03-02 PROCEDURE — 96375 TX/PRO/DX INJ NEW DRUG ADDON: CPT

## 2022-03-02 PROCEDURE — 96374 THER/PROPH/DIAG INJ IV PUSH: CPT

## 2022-03-02 PROCEDURE — 83690 ASSAY OF LIPASE: CPT

## 2022-03-02 PROCEDURE — 99284 EMERGENCY DEPT VISIT MOD MDM: CPT

## 2022-03-02 PROCEDURE — 81003 URINALYSIS AUTO W/O SCOPE: CPT

## 2022-03-02 PROCEDURE — 85025 COMPLETE CBC W/AUTO DIFF WBC: CPT

## 2022-03-02 RX ORDER — SODIUM CHLORIDE 0.9 % (FLUSH) 0.9 %
5-10 SYRINGE (ML) INJECTION AS NEEDED
Status: DISCONTINUED | OUTPATIENT
Start: 2022-03-02 | End: 2022-03-03 | Stop reason: HOSPADM

## 2022-03-02 RX ORDER — ONDANSETRON 2 MG/ML
4 INJECTION INTRAMUSCULAR; INTRAVENOUS
Status: COMPLETED | OUTPATIENT
Start: 2022-03-02 | End: 2022-03-02

## 2022-03-02 RX ORDER — SODIUM CHLORIDE 0.9 % (FLUSH) 0.9 %
5-10 SYRINGE (ML) INJECTION EVERY 8 HOURS
Status: DISCONTINUED | OUTPATIENT
Start: 2022-03-02 | End: 2022-03-03 | Stop reason: HOSPADM

## 2022-03-02 RX ORDER — HYDROMORPHONE HYDROCHLORIDE 1 MG/ML
0.5 INJECTION, SOLUTION INTRAMUSCULAR; INTRAVENOUS; SUBCUTANEOUS ONCE
Status: COMPLETED | OUTPATIENT
Start: 2022-03-02 | End: 2022-03-02

## 2022-03-02 RX ADMIN — ONDANSETRON 4 MG: 2 INJECTION INTRAMUSCULAR; INTRAVENOUS at 22:31

## 2022-03-02 RX ADMIN — HYDROMORPHONE HYDROCHLORIDE 0.5 MG: 1 INJECTION, SOLUTION INTRAMUSCULAR; INTRAVENOUS; SUBCUTANEOUS at 22:32

## 2022-03-02 NOTE — ED TRIAGE NOTES
Patient states that she was sent by her PCP for potential bladder infection. Patient states that she had a fever this morning. No fever in triage. Complaints of bilateral flank pain. Patient denies blood in her urine. Burning with urination.

## 2022-03-03 ENCOUNTER — APPOINTMENT (OUTPATIENT)
Dept: GENERAL RADIOLOGY | Age: 57
End: 2022-03-03
Attending: PHYSICIAN ASSISTANT
Payer: MEDICARE

## 2022-03-03 LAB
BILIRUB UR QL: NEGATIVE
GLUCOSE UR QL STRIP.AUTO: >1000 MG/DL
KETONES UR-MCNC: NEGATIVE MG/DL
LEUKOCYTE ESTERASE UR QL STRIP: NEGATIVE
NITRITE UR QL: NEGATIVE
PH UR: 5 [PH] (ref 5–9)
PROT UR QL: NEGATIVE MG/DL
RBC # UR STRIP: NEGATIVE /UL
SP GR UR: 1.01 (ref 1–1.02)
UROBILINOGEN UR QL: 0.2 EU/DL (ref 0.2–1)

## 2022-03-03 PROCEDURE — 72100 X-RAY EXAM L-S SPINE 2/3 VWS: CPT

## 2022-03-03 RX ORDER — LIDOCAINE 50 MG/G
PATCH TOPICAL
Qty: 30 EACH | Refills: 0 | Status: SHIPPED | OUTPATIENT
Start: 2022-03-03 | End: 2022-04-06

## 2022-03-03 NOTE — ED PROVIDER NOTES
Patient is here with right-sided flank/back pain that started yesterday. She states that it was on the left side but now is on the right. It has been hurting in her back. She did not injure it. She states that is been difficult to urinate. No hematuria or dysuria. She had kidney stones years ago and this feels similar. No chest pain, shortness of breath, upper abdominal pain, dizziness, weakness, dyspnea exertion, orthopnea, new swelling tingling or weakness to her arms or legs or other new symptoms. She is in pain management and takes hydrocodone. That has not helped her pain. She does not get menstrual cycles anymore. The history is provided by the patient. Flank Pain  This is a new problem. The current episode started yesterday. The problem occurs constantly. The problem has been gradually worsening. Pertinent negatives include no chest pain, no abdominal pain, no headaches and no shortness of breath. Nothing aggravates the symptoms. Nothing relieves the symptoms. She has tried nothing for the symptoms.         Past Medical History:   Diagnosis Date    Asthma     CAD (coronary artery disease)     mild non-obstuctive CAD cath 08/12/19 echo 08/13/19EF 55-60%    Chronic pain     Diabetes ([address]     avg 156, s/s hypo 62    Hypertension     Ill-defined condition     chronic back problems    Morbid obesity ([address]     Thromboembolus ([address] 2019    dvt x2 right leg       Past Surgical History:   Procedure Laterality Date    COLONOSCOPY N/A 11/4/2019    COLONOSCOPY/ BMI 61 performed by Abhinav Bourgeois MD at [address] [facility] HX CERVICAL FUSION  2019    HX [address]    HX CHOLECYSTECTOMY      HX GI      hernia repair    HX HEART CATHETERIZATION      no stent    HX HERNIA REPAIR      HX ORTHOPAEDIC Right 2012    trigger-finger release         Family History:   Problem Relation Age of Onset    Hypertension Mother     Diabetes Mother     Heart Attack Father     Cancer Brother colon    Other Sister         brain aneurysm       Social History     Socioeconomic History    Marital status:      Spouse name: Not on file    Number of children: Not on file    Years of education: Not on file    Highest education level: Not on file   Occupational History    Not on file   Tobacco Use    Smoking status: Never Smoker    Smokeless tobacco: Never Used   Substance and Sexual Activity    Alcohol use: Never    Drug use: Never    Sexual activity: Not on file   Other Topics Concern    Not on file   Social History Narrative    Not on file     Social Determinants of Health     Financial Resource Strain:     Difficulty of Paying Living Expenses: Not on file   Food Insecurity:     Worried About [address] in the Last Year: Not on file    [place] of Food in the Last Year: Not on file   Transportation Needs:     Lack of Transportation (Medical): Not on file    Lack of Transportation (Non-Medical):  Not on file   Physical Activity:     Days of Exercise per Week: Not on file    Minutes of Exercise per Session: Not on file   Stress:     Feeling of Stress : Not on file   Social Connections:     Frequency of Communication with Friends and Family: Not on file    Frequency of Social Gatherings with Friends and Family: Not on file    Attends Anabaptism Services: Not on file    Active Member of [address] [place] [address] Appticles or Organizations: Not on file    Attends Club or Organization Meetings: Not on file    Marital Status: Not on file   Intimate Partner Violence:     Fear of Current or Ex-Partner: Not on file    Emotionally Abused: Not on file    Physically Abused: Not on file    Sexually Abused: Not on file   Housing Stability:     Unable to Pay for Housing in the Last Year: Not on file    Number of [place] in the Last Year: Not on file    Unstable Housing in the Last Year: Not on file         ALLERGIES: Gabapentin, Lyrica [pregabalin], Niacin, Nystatin, and Percocet [oxycodone-acetaminophen]    Review of Systems   Constitutional: Negative. HENT: Negative. Eyes: Negative. Respiratory: Negative. Negative for shortness of breath. Cardiovascular: Negative. Negative for chest pain. Gastrointestinal: Negative. Negative for abdominal pain. Genitourinary: Positive for flank pain. Skin: Negative. Neurological: Negative. Negative for headaches. Psychiatric/Behavioral: Negative. All other systems reviewed and are negative. Vitals:    03/02/22 1836 03/02/22 2039 03/02/22 2039   BP: (!) 160/85  (!) 177/73   Pulse: 86  70   Resp: 17 22   Temp: 97.7 °F (36.5 °C)  98.1 °F (36.7 °C)   SpO2: 100%  98%   Weight: (!) 180.5 kg (398 lb) (!) 180.5 kg (398 lb)    Height:  5' 9\" (1.753 m)             Physical Exam  Vitals and nursing note reviewed. Constitutional:       Appearance: She is well-developed. She is obese. HENT:      Head: Normocephalic and atraumatic. Right Ear: External ear normal.      Left Ear: External ear normal.      Nose: Nose normal.      Mouth/Throat:      Mouth: Mucous membranes are moist.   Eyes:      Extraocular Movements: Extraocular movements intact. Conjunctiva/sclera: Conjunctivae normal.      Pupils: Pupils are equal, round, and reactive to light. Cardiovascular:      Rate and Rhythm: Normal rate and regular rhythm. Pulses: Normal pulses. Heart sounds: Normal heart sounds. Pulmonary:      Effort: Pulmonary effort is normal.      Breath sounds: Normal breath sounds. Abdominal:      General: Bowel sounds are normal.      Palpations: Abdomen is soft. Musculoskeletal:         General: Normal range of motion. Cervical back: Normal range of motion and neck supple. Lumbar back: Tenderness present. Back:    Skin:     General: Skin is warm and dry. Capillary Refill: Capillary refill takes less than 2 seconds. Neurological:      General: No focal deficit present.       Mental Status: She is alert and oriented to person, place, and time. Deep Tendon Reflexes: Reflexes are normal and symmetric. Psychiatric:         Mood and Affect: Mood normal.         Behavior: Behavior normal.         Thought Content: Thought content normal.         Judgment: Judgment normal.          MDM  Number of Diagnoses or Management Options     Amount and/or Complexity of Data Reviewed  Clinical lab tests: reviewed  Tests in the radiology section of CPT®: ordered and reviewed    Risk of Complications, Morbidity, and/or Mortality  Presenting problems: moderate  Diagnostic procedures: moderate  Management options: moderate    Patient Progress  Patient progress: improved         Procedures      The patient was observed in the ED. Results Reviewed:      Recent Results (from the past 24 hour(s))   CBC WITH AUTOMATED DIFF    Collection Time: 03/02/22  6:42 PM   Result Value Ref Range    WBC 10.4 4.3 - 11.1 K/uL    RBC 4.33 4.05 - 5.2 M/uL    HGB 12.6 11.7 - 15.4 g/dL    HCT 41.8 35.8 - 46.3 %    MCV 96.5 79.6 - 97.8 FL    MCH 29.1 26.1 - 32.9 PG    MCHC 30.1 (L) 31.4 - 35.0 g/dL    RDW 14.3 11.9 - 14.6 %    PLATELET 638 132 - 901 K/uL    MPV 10.5 9.4 - 12.3 FL    ABSOLUTE NRBC 0.00 0.0 - 0.2 K/uL    DF AUTOMATED      NEUTROPHILS 66 43 - 78 %    LYMPHOCYTES 27 13 - 44 %    MONOCYTES 4 4.0 - 12.0 %    EOSINOPHILS 1 0.5 - 7.8 %    BASOPHILS 1 0.0 - 2.0 %    IMMATURE GRANULOCYTES 0 0.0 - 5.0 %    ABS. NEUTROPHILS 6.9 1.7 - 8.2 K/UL    ABS. LYMPHOCYTES 2.8 0.5 - 4.6 K/UL    ABS. MONOCYTES 0.4 0.1 - 1.3 K/UL    ABS. EOSINOPHILS 0.1 0.0 - 0.8 K/UL    ABS. BASOPHILS 0.1 0.0 - 0.2 K/UL    ABS. IMM.  GRANS. 0.0 0.0 - 0.5 K/UL   METABOLIC PANEL, COMPREHENSIVE    Collection Time: 03/02/22  6:42 PM   Result Value Ref Range    Sodium 137 136 - 145 mmol/L    Potassium 4.1 3.5 - 5.1 mmol/L    Chloride 100 98 - 107 mmol/L    CO2 31 21 - 32 mmol/L    Anion gap 6 (L) 7 - 16 mmol/L    Glucose 205 (H) 65 - 100 mg/dL    BUN 28 (H) 6 - 23 MG/DL Creatinine 1.30 (H) 0.6 - 1.0 MG/DL    GFR est AA 54 (L) >60 ml/min/1.73m2    GFR est non-AA 45 (L) >60 ml/min/1.73m2    Calcium 9.4 8.3 - 10.4 MG/DL    Bilirubin, total 1.3 (H) 0.2 - 1.1 MG/DL    ALT (SGPT) 30 12 - 65 U/L    AST (SGOT) 29 15 - 37 U/L    Alk. phosphatase 141 (H) 50 - 136 U/L    Protein, total 8.0 6.3 - 8.2 g/dL    Albumin 3.0 (L) 3.5 - 5.0 g/dL    Globulin 5.0 (H) 2.3 - 3.5 g/dL    A-G Ratio 0.6 (L) 1.2 - 3.5     LIPASE    Collection Time: 03/02/22  6:42 PM   Result Value Ref Range    Lipase 215 73 - 393 U/L     XR SPINE LUMB 2 OR 3 V   Final Result      1. No acute fracture or dislocation in the lumbar spine. 2. Mild degenerative changes. CT ABD/PEL FOR RENAL STONE   Final Result      1. Negative for renal calculus or hydronephrosis. 2. Sigmoid diverticulosis. Negative for diverticulitis, colitis, appendicitis or   bowel obstruction. 3. Cholecystectomy. Hepatomegaly. 4. Lower anterior abdominal wall skin thickening, nonspecific. 5. Absence of IV contrast limits sensitivity. Patient's urine was normal.  Patient has pain in her back and is morbidly obese. X-ray does not show acute changes. We have given her pain medicine here which has helped significantly. Giselle James She was encouraged to stretch multiple times a day, use warm moist heat, massage and stretching. She was counseled on water aerobics and striving for some weight loss. She has a normal neurologic exam at this time and was advised to follow-up with her primary care physician for recheck. I have written for lidocaine patches. She does go to pain management and receives pain medicine from them. I discussed the results of all labs, procedures, radiographs, and treatments with the patient and available family. Treatment plan is agreed upon and the patient is ready for discharge. All voiced understanding of the discharge plan and medication instructions or changes as appropriate.   Questions about treatment in the ED were answered. All were encouraged to return should symptoms worsen or new problems develop.

## 2022-03-03 NOTE — ED NOTES
I have reviewed discharge instructions with the patient  . The patient verbalized understanding. Patient left ED via Discharge Method: wheelchair to Home with family    Opportunity for questions and clarification provided. Patient given 1 scripts. To continue your aftercare when you leave the hospital, you may receive an automated call from our care team to check in on how you are doing. This is a free service and part of our promise to provide the best care and service to meet your aftercare needs.  If you have questions, or wish to unsubscribe from this service please call [phone]. Thank you for Choosing our [facility] Emergency Department.

## 2022-03-18 PROBLEM — E11.3293 NON-PROLIFERATIVE DIABETIC RETINOPATHY, BOTH EYES (HCC): Status: ACTIVE | Noted: 2021-03-09

## 2022-03-18 PROBLEM — R41.3 POOR LONG-TERM MEMORY: Status: ACTIVE | Noted: 2019-07-29

## 2022-03-18 PROBLEM — Z86.718 HISTORY OF DVT (DEEP VEIN THROMBOSIS): Status: ACTIVE | Noted: 2020-12-08

## 2022-03-18 PROBLEM — R07.9 CHEST PAIN: Status: ACTIVE | Noted: 2020-11-22

## 2022-03-19 ENCOUNTER — APPOINTMENT (OUTPATIENT)
Dept: GENERAL RADIOLOGY | Age: 57
End: 2022-03-19
Attending: STUDENT IN AN ORGANIZED HEALTH CARE EDUCATION/TRAINING PROGRAM
Payer: MEDICARE

## 2022-03-19 PROBLEM — F41.9 ANXIETY: Status: ACTIVE | Noted: 2019-07-29

## 2022-03-19 PROBLEM — I26.99 ACUTE PULMONARY EMBOLISM WITHOUT ACUTE COR PULMONALE (HCC): Status: ACTIVE | Noted: 2020-12-30

## 2022-03-19 PROBLEM — K21.9 GASTROESOPHAGEAL REFLUX DISEASE: Status: ACTIVE | Noted: 2017-11-02

## 2022-03-19 PROBLEM — R06.02 SOB (SHORTNESS OF BREATH): Status: ACTIVE | Noted: 2020-11-18

## 2022-03-19 PROBLEM — R80.9 MICROALBUMINURIA: Status: ACTIVE | Noted: 2018-03-20

## 2022-03-19 PROBLEM — E11.21 TYPE 2 DIABETES WITH NEPHROPATHY (HCC): Status: ACTIVE | Noted: 2020-10-07

## 2022-03-19 PROBLEM — F41.8 DEPRESSION WITH ANXIETY: Status: ACTIVE | Noted: 2019-09-30

## 2022-03-19 PROBLEM — U07.1 PNEUMONIA DUE TO COVID-19 VIRUS: Status: ACTIVE | Noted: 2020-12-12

## 2022-03-19 PROBLEM — R07.89 OTHER CHEST PAIN: Status: ACTIVE | Noted: 2020-11-22

## 2022-03-19 PROBLEM — I50.32 CHRONIC DIASTOLIC CONGESTIVE HEART FAILURE (HCC): Status: ACTIVE | Noted: 2020-11-22

## 2022-03-19 PROBLEM — J12.82 PNEUMONIA DUE TO COVID-19 VIRUS: Status: ACTIVE | Noted: 2020-12-12

## 2022-03-19 PROBLEM — R32 URINARY INCONTINENCE: Status: ACTIVE | Noted: 2021-03-09

## 2022-03-19 LAB
ALBUMIN SERPL-MCNC: 3 G/DL (ref 3.5–5)
ALBUMIN/GLOB SERPL: 0.7 {RATIO} (ref 1.2–3.5)
ALP SERPL-CCNC: 139 U/L (ref 50–136)
ALT SERPL-CCNC: 25 U/L (ref 12–65)
ANION GAP SERPL CALC-SCNC: 6 MMOL/L (ref 7–16)
AST SERPL-CCNC: 11 U/L (ref 15–37)
BASOPHILS # BLD: 0 K/UL (ref 0–0.2)
BASOPHILS NFR BLD: 0 % (ref 0–2)
BILIRUB SERPL-MCNC: 0.7 MG/DL (ref 0.2–1.1)
BNP SERPL-MCNC: 171 PG/ML (ref 5–125)
BUN SERPL-MCNC: 33 MG/DL (ref 6–23)
CALCIUM SERPL-MCNC: 9.3 MG/DL (ref 8.3–10.4)
CHLORIDE SERPL-SCNC: 101 MMOL/L (ref 98–107)
CO2 SERPL-SCNC: 30 MMOL/L (ref 21–32)
CREAT SERPL-MCNC: 1.7 MG/DL (ref 0.6–1)
DIFFERENTIAL METHOD BLD: ABNORMAL
EOSINOPHIL # BLD: 0.1 K/UL (ref 0–0.8)
EOSINOPHIL NFR BLD: 1 % (ref 0.5–7.8)
ERYTHROCYTE [DISTWIDTH] IN BLOOD BY AUTOMATED COUNT: 14.2 % (ref 11.9–14.6)
GLOBULIN SER CALC-MCNC: 4.1 G/DL (ref 2.3–3.5)
GLUCOSE SERPL-MCNC: 166 MG/DL (ref 65–100)
HCT VFR BLD AUTO: 41.3 % (ref 35.8–46.3)
HGB BLD-MCNC: 12.7 G/DL (ref 11.7–15.4)
IMM GRANULOCYTES # BLD AUTO: 0 K/UL (ref 0–0.5)
IMM GRANULOCYTES NFR BLD AUTO: 0 % (ref 0–5)
LYMPHOCYTES # BLD: 2.5 K/UL (ref 0.5–4.6)
LYMPHOCYTES NFR BLD: 22 % (ref 13–44)
MAGNESIUM SERPL-MCNC: 2.2 MG/DL (ref 1.8–2.4)
MCH RBC QN AUTO: 29.7 PG (ref 26.1–32.9)
MCHC RBC AUTO-ENTMCNC: 30.8 G/DL (ref 31.4–35)
MCV RBC AUTO: 96.5 FL (ref 79.6–97.8)
MONOCYTES # BLD: 0.6 K/UL (ref 0.1–1.3)
MONOCYTES NFR BLD: 5 % (ref 4–12)
NEUTS SEG # BLD: 8.1 K/UL (ref 1.7–8.2)
NEUTS SEG NFR BLD: 72 % (ref 43–78)
NRBC # BLD: 0 K/UL (ref 0–0.2)
PLATELET # BLD AUTO: 356 K/UL (ref 150–450)
PMV BLD AUTO: 11.1 FL (ref 9.4–12.3)
POTASSIUM SERPL-SCNC: 3.5 MMOL/L (ref 3.5–5.1)
PROT SERPL-MCNC: 7.1 G/DL (ref 6.3–8.2)
RBC # BLD AUTO: 4.28 M/UL (ref 4.05–5.2)
SODIUM SERPL-SCNC: 137 MMOL/L (ref 136–145)
TROPONIN-HIGH SENSITIVITY: 8.9 PG/ML (ref 0–14)
WBC # BLD AUTO: 11.4 K/UL (ref 4.3–11.1)

## 2022-03-19 PROCEDURE — 84484 ASSAY OF TROPONIN QUANT: CPT

## 2022-03-19 PROCEDURE — 71045 X-RAY EXAM CHEST 1 VIEW: CPT

## 2022-03-19 PROCEDURE — 99285 EMERGENCY DEPT VISIT HI MDM: CPT

## 2022-03-19 PROCEDURE — 94762 N-INVAS EAR/PLS OXIMTRY CONT: CPT

## 2022-03-19 PROCEDURE — 85025 COMPLETE CBC W/AUTO DIFF WBC: CPT

## 2022-03-19 PROCEDURE — 80053 COMPREHEN METABOLIC PANEL: CPT

## 2022-03-19 PROCEDURE — 85379 FIBRIN DEGRADATION QUANT: CPT

## 2022-03-19 PROCEDURE — 83880 ASSAY OF NATRIURETIC PEPTIDE: CPT

## 2022-03-19 PROCEDURE — 93005 ELECTROCARDIOGRAM TRACING: CPT

## 2022-03-19 PROCEDURE — 83735 ASSAY OF MAGNESIUM: CPT

## 2022-03-19 RX ORDER — SODIUM CHLORIDE 0.9 % (FLUSH) 0.9 %
5-10 SYRINGE (ML) INJECTION EVERY 8 HOURS
Status: DISCONTINUED | OUTPATIENT
Start: 2022-03-19 | End: 2022-03-20 | Stop reason: HOSPADM

## 2022-03-19 RX ORDER — SODIUM CHLORIDE 0.9 % (FLUSH) 0.9 %
5-10 SYRINGE (ML) INJECTION AS NEEDED
Status: DISCONTINUED | OUTPATIENT
Start: 2022-03-19 | End: 2022-03-20 | Stop reason: HOSPADM

## 2022-03-20 ENCOUNTER — HOSPITAL ENCOUNTER (EMERGENCY)
Age: 57
Discharge: HOME | End: 2022-03-20
Payer: MEDICARE

## 2022-03-20 ENCOUNTER — APPOINTMENT (OUTPATIENT)
Dept: CT IMAGING | Age: 57
End: 2022-03-20
Payer: MEDICARE

## 2022-03-20 VITALS
RESPIRATION RATE: 16 BRPM | OXYGEN SATURATION: 93 % | DIASTOLIC BLOOD PRESSURE: 64 MMHG | HEIGHT: 69 IN | TEMPERATURE: 98.1 F | SYSTOLIC BLOOD PRESSURE: 127 MMHG | WEIGHT: 293 LBS | BODY MASS INDEX: 43.4 KG/M2 | HEART RATE: 79 BPM

## 2022-03-20 DIAGNOSIS — R09.02 HYPOXIA: ICD-10-CM

## 2022-03-20 DIAGNOSIS — R06.02 SOB (SHORTNESS OF BREATH): Primary | ICD-10-CM

## 2022-03-20 PROBLEM — R60.9 EDEMA: Status: ACTIVE | Noted: 2017-10-20

## 2022-03-20 PROBLEM — I82.509 CHRONIC DEEP VEIN THROMBOSIS (DVT) OF LOWER EXTREMITY (HCC): Status: ACTIVE | Noted: 2019-07-29

## 2022-03-20 PROBLEM — E11.9 DIABETES MELLITUS (HCC): Status: ACTIVE | Noted: 2019-07-29

## 2022-03-20 LAB
ATRIAL RATE: 90 BPM
CALCULATED P AXIS, ECG09: 70 DEGREES
CALCULATED R AXIS, ECG10: 33 DEGREES
CALCULATED T AXIS, ECG11: -5 DEGREES
D DIMER PPP FEU-MCNC: 1.38 UG/ML(FEU)
DIAGNOSIS, 93000: NORMAL
P-R INTERVAL, ECG05: 142 MS
Q-T INTERVAL, ECG07: 374 MS
QRS DURATION, ECG06: 98 MS
QTC CALCULATION (BEZET), ECG08: 457 MS
TROPONIN-HIGH SENSITIVITY: 8.8 PG/ML (ref 0–14)
VENTRICULAR RATE, ECG03: 90 BPM

## 2022-03-20 PROCEDURE — 74011000636 HC RX REV CODE- 636

## 2022-03-20 PROCEDURE — 74011250636 HC RX REV CODE- 250/636

## 2022-03-20 PROCEDURE — 71260 CT THORAX DX C+: CPT

## 2022-03-20 PROCEDURE — 74011000258 HC RX REV CODE- 258

## 2022-03-20 PROCEDURE — 84484 ASSAY OF TROPONIN QUANT: CPT

## 2022-03-20 PROCEDURE — 74011000250 HC RX REV CODE- 250

## 2022-03-20 RX ORDER — SODIUM CHLORIDE 0.9 % (FLUSH) 0.9 %
10 SYRINGE (ML) INJECTION
Status: COMPLETED | OUTPATIENT
Start: 2022-03-20 | End: 2022-03-20

## 2022-03-20 RX ORDER — SODIUM CHLORIDE 9 MG/ML
125 INJECTION, SOLUTION INTRAVENOUS CONTINUOUS
Status: DISCONTINUED | OUTPATIENT
Start: 2022-03-20 | End: 2022-03-20 | Stop reason: HOSPADM

## 2022-03-20 RX ADMIN — SODIUM CHLORIDE 125 ML/HR: 900 INJECTION, SOLUTION INTRAVENOUS at 05:38

## 2022-03-20 RX ADMIN — SODIUM CHLORIDE 500 ML: 900 INJECTION, SOLUTION INTRAVENOUS at 05:38

## 2022-03-20 RX ADMIN — SODIUM CHLORIDE, PRESERVATIVE FREE 5 ML: 5 INJECTION INTRAVENOUS at 05:37

## 2022-03-20 RX ADMIN — Medication 10 ML: at 07:17

## 2022-03-20 RX ADMIN — IOPAMIDOL 100 ML: 755 INJECTION, SOLUTION INTRAVENOUS at 07:13

## 2022-03-20 RX ADMIN — SODIUM CHLORIDE 100 ML: 9 INJECTION, SOLUTION INTRAVENOUS at 07:17

## 2022-03-20 RX ADMIN — SODIUM CHLORIDE, PRESERVATIVE FREE 5 ML: 5 INJECTION INTRAVENOUS at 05:38

## 2022-03-20 NOTE — ED NOTES
I have reviewed discharge instructions with the patient and family. The patient and family verbalized understanding. Patient left ED via Discharge Method: ambulatory to Home with family. Opportunity for questions and clarification provided. Patient given 0 scripts. To continue your aftercare when you leave the hospital, you may receive an automated call from our care team to check in on how you are doing. This is a free service and part of our promise to provide the best care and service to meet your aftercare needs.  If you have questions, or wish to unsubscribe from this service please call [phone]. Thank you for Choosing our [address] [place] [address] Emergency Department.

## 2022-03-20 NOTE — ED TRIAGE NOTES
Arrives with face mask in place. Arrives via wheelchair with assistance from staff. Reports shortness of breath, midsternal chest \"tightness\". Onset earlier today. Attempted inhaler with improvement in symptoms. States symptoms returned tonight. Reports checks 02 sats at home, earlier they were 96% however tonight they were 88%. Hx CHF, asthma.  Denies cough, fever/chills, n/v/d

## 2022-03-20 NOTE — ED PROVIDER NOTES
58-year-old female long medical history including CAD diabetes hypertension morbid obesity and thromboembolus of the leg. Had a period of hypoxia at home called the ambulance she is sats were low. Patient was not in any distress. Shortness of Breath  This is a new problem. The current episode started 1 to 2 hours ago. The problem has been rapidly improving. Associated symptoms include cough. Pertinent negatives include no fever. It is unknown what precipitated the problem. She has tried nothing for the symptoms.         Past Medical History:   Diagnosis Date    Asthma     CAD (coronary artery disease)     mild non-obstuctive CAD cath 08/12/19 echo 08/13/19EF 55-60%    Chronic pain     Diabetes ([address]     avg 156, s/s hypo 62    Hypertension     Ill-defined condition     chronic back problems    Morbid obesity ([address]     Thromboembolus ([address] 2019    dvt x2 right leg       Past Surgical History:   Procedure Laterality Date    COLONOSCOPY N/A 11/4/2019    COLONOSCOPY/ BMI 61 performed by Leydi Rubin MD at [facility] HX CERVICAL FUSION  2019    HX [address]    HX CHOLECYSTECTOMY      HX GI      hernia repair    HX HEART CATHETERIZATION      no stent    HX HERNIA REPAIR      HX ORTHOPAEDIC Right 2012    trigger-finger release         Family History:   Problem Relation Age of Onset    Hypertension Mother     Diabetes Mother     Heart Attack Father     Cancer Brother         colon    Other Sister         brain aneurysm       Social History     Socioeconomic History    Marital status:      Spouse name: Not on file    Number of children: Not on file    Years of education: Not on file    Highest education level: Not on file   Occupational History    Not on file   Tobacco Use    Smoking status: Never Smoker    Smokeless tobacco: Never Used   Substance and Sexual Activity    Alcohol use: Never    Drug use: Never    Sexual activity: Not on file   Other Topics Concern    Not on file   Social History Narrative    Not on file     Social Determinants of Health     Financial Resource Strain:     Difficulty of Paying Living Expenses: Not on file   Food Insecurity:     Worried About Running Out of Food in the Last Year: Not on file    Sumeet of Food in the Last Year: Not on file   Transportation Needs:     Lack of Transportation (Medical): Not on file    Lack of Transportation (Non-Medical): Not on file   Physical Activity:     Days of Exercise per Week: Not on file    Minutes of Exercise per Session: Not on file   Stress:     Feeling of Stress : Not on file   Social Connections:     Frequency of Communication with Friends and Family: Not on file    Frequency of Social Gatherings with Friends and Family: Not on file    Attends Sabianist Services: Not on file    Active Member of [address] [place] [address] [employer] or Organizations: Not on file    Attends Club or Organization Meetings: Not on file    Marital Status: Not on file   Intimate Partner Violence:     Fear of Current or Ex-Partner: Not on file    Emotionally Abused: Not on file    Physically Abused: Not on file    Sexually Abused: Not on file   Housing Stability:     Unable to Pay for Housing in the Last Year: Not on file    Number of [place] in the Last Year: Not on file    Unstable Housing in the Last Year: Not on file         ALLERGIES: Gabapentin, Lyrica [pregabalin], Niacin, Nystatin, and Percocet [oxycodone-acetaminophen]    Review of Systems   Constitutional: Negative. Negative for activity change and fever. HENT: Negative. Eyes: Negative. Respiratory: Positive for cough and shortness of breath. Cardiovascular: Negative. Gastrointestinal: Negative. Genitourinary: Negative. Musculoskeletal: Negative. Skin: Negative. Neurological: Negative. Psychiatric/Behavioral: Negative. All other systems reviewed and are negative.       Vitals:    03/20/22 0600 03/20/22 0611 03/20/22 0700 03/20/22 0838   BP: (!) 114/57  (!) 137/55 127/64   Pulse: 83 81 84 79   Resp: 15 20 16    Temp: 98.1 °F (36.7 °C)      SpO2:  95% 93% 93%   Weight:       Height:                Physical Exam  Vitals and nursing note reviewed. Constitutional:       General: She is not in acute distress. Appearance: She is well-developed. HENT:      Head: Normocephalic and atraumatic. Right Ear: External ear normal.      Left Ear: External ear normal.      Nose: Nose normal.   Eyes:      General: No scleral icterus. Right eye: No discharge. Left eye: No discharge. Conjunctiva/sclera: Conjunctivae normal.      Pupils: Pupils are equal, round, and reactive to light. Cardiovascular:      Rate and Rhythm: Regular rhythm. Pulmonary:      Effort: Pulmonary effort is normal. No respiratory distress. Breath sounds: Normal breath sounds. No stridor. No wheezing or rales. Abdominal:      General: Bowel sounds are normal. There is no distension. Palpations: Abdomen is soft. Tenderness: There is no abdominal tenderness. Musculoskeletal:         General: Normal range of motion. Cervical back: Normal range of motion. Skin:     General: Skin is warm and dry. Findings: No rash. Neurological:      Mental Status: She is alert and oriented to person, place, and time. Motor: No abnormal muscle tone. Coordination: Coordination normal.   Psychiatric:         Behavior: Behavior normal.          MDM  Number of Diagnoses or Management Options  Hypoxia: established and worsening  SOB (shortness of breath): established and worsening  Diagnosis management comments: Patient SaO2 returned to normal upon arrival in the ED she was observed for many hours had a work-up which showed cardiac and respiratory work-up which were negative he had elevated D-dimer CT was performed no PE was noted. Could have been a short period of hypoventilation versus it error in reading's pulse ox.   She did not appear per EMS to be an extremis at SaO2 of 70%       Amount and/or Complexity of Data Reviewed  Clinical lab tests: ordered and reviewed  Tests in the medicine section of CPT®: ordered and reviewed  Decide to obtain previous medical records or to obtain history from someone other than the patient: yes  Review and summarize past medical records: yes  Independent visualization of images, tracings, or specimens: yes    Risk of Complications, Morbidity, and/or Mortality  Presenting problems: high  Diagnostic procedures: high  Management options: high           Procedures

## 2022-04-11 LAB
ALBUMIN SERPL-MCNC: 3.1 G/DL (ref 3.5–5)
ALBUMIN/GLOB SERPL: 0.6 {RATIO} (ref 1.2–3.5)
ALP SERPL-CCNC: 162 U/L (ref 50–136)
ALT SERPL-CCNC: 21 U/L (ref 12–65)
ANION GAP SERPL CALC-SCNC: 6 MMOL/L (ref 7–16)
AST SERPL-CCNC: 10 U/L (ref 15–37)
BASOPHILS # BLD: 0.1 K/UL (ref 0–0.2)
BASOPHILS NFR BLD: 0 % (ref 0–2)
BILIRUB SERPL-MCNC: 0.8 MG/DL (ref 0.2–1.1)
BUN SERPL-MCNC: 23 MG/DL (ref 6–23)
CALCIUM SERPL-MCNC: 9.2 MG/DL (ref 8.3–10.4)
CHLORIDE SERPL-SCNC: 103 MMOL/L (ref 98–107)
CO2 SERPL-SCNC: 32 MMOL/L (ref 21–32)
CREAT SERPL-MCNC: 1.2 MG/DL (ref 0.6–1)
DIFFERENTIAL METHOD BLD: ABNORMAL
EOSINOPHIL # BLD: 0.1 K/UL (ref 0–0.8)
EOSINOPHIL NFR BLD: 1 % (ref 0.5–7.8)
ERYTHROCYTE [DISTWIDTH] IN BLOOD BY AUTOMATED COUNT: 14 % (ref 11.9–14.6)
GLOBULIN SER CALC-MCNC: 5 G/DL (ref 2.3–3.5)
GLUCOSE SERPL-MCNC: 131 MG/DL (ref 65–100)
HCT VFR BLD AUTO: 42.8 % (ref 35.8–46.3)
HGB BLD-MCNC: 13.6 G/DL (ref 11.7–15.4)
IMM GRANULOCYTES # BLD AUTO: 0.1 K/UL (ref 0–0.5)
IMM GRANULOCYTES NFR BLD AUTO: 0 % (ref 0–5)
LIPASE SERPL-CCNC: 137 U/L (ref 73–393)
LYMPHOCYTES # BLD: 2.5 K/UL (ref 0.5–4.6)
LYMPHOCYTES NFR BLD: 19 % (ref 13–44)
MAGNESIUM SERPL-MCNC: 2 MG/DL (ref 1.8–2.4)
MCH RBC QN AUTO: 29.6 PG (ref 26.1–32.9)
MCHC RBC AUTO-ENTMCNC: 31.8 G/DL (ref 31.4–35)
MCV RBC AUTO: 93.2 FL (ref 79.6–97.8)
MONOCYTES # BLD: 0.7 K/UL (ref 0.1–1.3)
MONOCYTES NFR BLD: 5 % (ref 4–12)
NEUTS SEG # BLD: 9.6 K/UL (ref 1.7–8.2)
NEUTS SEG NFR BLD: 74 % (ref 43–78)
NRBC # BLD: 0 K/UL (ref 0–0.2)
PLATELET # BLD AUTO: 366 K/UL (ref 150–450)
PMV BLD AUTO: 10.4 FL (ref 9.4–12.3)
POTASSIUM SERPL-SCNC: 3.5 MMOL/L (ref 3.5–5.1)
PROT SERPL-MCNC: 8.1 G/DL (ref 6.3–8.2)
RBC # BLD AUTO: 4.59 M/UL (ref 4.05–5.2)
SODIUM SERPL-SCNC: 141 MMOL/L (ref 136–145)
TROPONIN-HIGH SENSITIVITY: 12.5 PG/ML (ref 0–14)
WBC # BLD AUTO: 13.1 K/UL (ref 4.3–11.1)

## 2022-04-11 PROCEDURE — 85025 COMPLETE CBC W/AUTO DIFF WBC: CPT

## 2022-04-11 PROCEDURE — 99284 EMERGENCY DEPT VISIT MOD MDM: CPT

## 2022-04-11 PROCEDURE — 83735 ASSAY OF MAGNESIUM: CPT

## 2022-04-11 PROCEDURE — 84484 ASSAY OF TROPONIN QUANT: CPT

## 2022-04-11 PROCEDURE — 83690 ASSAY OF LIPASE: CPT

## 2022-04-11 PROCEDURE — 80053 COMPREHEN METABOLIC PANEL: CPT

## 2022-04-11 PROCEDURE — 94762 N-INVAS EAR/PLS OXIMTRY CONT: CPT

## 2022-04-11 PROCEDURE — 93005 ELECTROCARDIOGRAM TRACING: CPT | Performed by: EMERGENCY MEDICINE

## 2022-04-11 RX ORDER — SODIUM CHLORIDE 0.9 % (FLUSH) 0.9 %
5-10 SYRINGE (ML) INJECTION AS NEEDED
Status: DISCONTINUED | OUTPATIENT
Start: 2022-04-11 | End: 2022-04-12 | Stop reason: HOSPADM

## 2022-04-11 RX ORDER — SODIUM CHLORIDE 0.9 % (FLUSH) 0.9 %
5-10 SYRINGE (ML) INJECTION EVERY 8 HOURS
Status: DISCONTINUED | OUTPATIENT
Start: 2022-04-11 | End: 2022-04-12 | Stop reason: HOSPADM

## 2022-04-12 ENCOUNTER — HOSPITAL ENCOUNTER (EMERGENCY)
Age: 57
Discharge: HOME | End: 2022-04-12
Attending: EMERGENCY MEDICINE
Payer: MEDICARE

## 2022-04-12 VITALS
RESPIRATION RATE: 19 BRPM | OXYGEN SATURATION: 94 % | HEIGHT: 69 IN | DIASTOLIC BLOOD PRESSURE: 82 MMHG | WEIGHT: 293 LBS | SYSTOLIC BLOOD PRESSURE: 141 MMHG | HEART RATE: 78 BPM | TEMPERATURE: 98.3 F | BODY MASS INDEX: 43.4 KG/M2

## 2022-04-12 DIAGNOSIS — E66.01 CLASS 3 SEVERE OBESITY WITHOUT SERIOUS COMORBIDITY WITH BODY MASS INDEX (BMI) OF 50.0 TO 59.9 IN ADULT, UNSPECIFIED OBESITY TYPE (HCC): ICD-10-CM

## 2022-04-12 DIAGNOSIS — I10 HYPERTENSION, UNSPECIFIED TYPE: Primary | ICD-10-CM

## 2022-04-12 DIAGNOSIS — R35.0 URINARY FREQUENCY: ICD-10-CM

## 2022-04-12 LAB
APPEARANCE UR: CLEAR
BILIRUB UR QL: NEGATIVE
COLOR UR: YELLOW
GLUCOSE UR STRIP.AUTO-MCNC: >1000 MG/DL
HGB UR QL STRIP: NEGATIVE
KETONES UR QL STRIP.AUTO: NEGATIVE MG/DL
LEUKOCYTE ESTERASE UR QL STRIP.AUTO: NEGATIVE
NITRITE UR QL STRIP.AUTO: NEGATIVE
PH UR STRIP: 5.5 [PH] (ref 5–9)
PROT UR STRIP-MCNC: NEGATIVE MG/DL
SP GR UR REFRACTOMETRY: 1.01 (ref 1–1.02)
TROPONIN-HIGH SENSITIVITY: 9.5 PG/ML (ref 0–14)
UROBILINOGEN UR QL STRIP.AUTO: 0.2 EU/DL (ref 0.2–1)

## 2022-04-12 PROCEDURE — 81003 URINALYSIS AUTO W/O SCOPE: CPT

## 2022-04-12 PROCEDURE — 84484 ASSAY OF TROPONIN QUANT: CPT

## 2022-04-12 RX ORDER — SULFAMETHOXAZOLE AND TRIMETHOPRIM 800; 160 MG/1; MG/1
1 TABLET ORAL 2 TIMES DAILY
Qty: 6 TABLET | Refills: 0 | Status: SHIPPED | OUTPATIENT
Start: 2022-04-12 | End: 2022-04-15

## 2022-04-12 NOTE — DISCHARGE INSTRUCTIONS
Continue taking your blood pressure medications as prescribed and working with your cardiologist regarding the medications. Take the antibiotic as prescribed for the next 3 days. If your symptoms do not improve you will need to have a urinalysis done outpatient.

## 2022-04-12 NOTE — ED TRIAGE NOTES
Pt arrives via POV, WC to triage. States BP was high at home, along with HA, CP, and SHOB that started today. Hx of high blood pressure and CHF, states she has been taking her medication at prescribed. PA in triage to assess patient.

## 2022-04-12 NOTE — ED NOTES
I have reviewed discharge instructions with the patient. The patient verbalized understanding. Patient left ED via Discharge Method: ambulatory to Home with daughter    Opportunity for questions and clarification provided. Patient given 1 scripts. To continue your aftercare when you leave the hospital, you may receive an automated call from our care team to check in on how you are doing. This is a free service and part of our promise to provide the best care and service to meet your aftercare needs.  If you have questions, or wish to unsubscribe from this service please call [phone]. Thank you for Choosing our [facility] Emergency Department.

## 2022-04-12 NOTE — ED PROVIDER NOTES
58-year-old female presenting to the emergency department complaining of hypertension at home. She says during this episode of high blood pressure she felt like there was a burning sensation in the upper chest.  Patient states that she also felt kind of clammy. She said that the symptoms lasted for about 3 hours but resolved while waiting here in the emergency department. She denied any focal neurologic deficits. She took her blood pressure at 5 PM and again at 7 PM and blood pressures were 180/102 and 193/162. Patient is followed by [facility] [place] and they instructed her to come to the emergency department. She takes hydralazine Iver Samuel and Cardizem at home and took her morning medications but because she was in the ER she did not take her evening medicines. The patient did not take any as needed blood pressure medications. On arrival blood pressure had improved significantly 158/105.                  Past Medical History:   Diagnosis Date    Asthma     CAD (coronary artery disease)     mild non-obstuctive CAD cath 08/12/19 echo 08/13/19EF 55-60%    Chronic pain     Diabetes ([address]     avg 156, s/s hypo 62    Hypertension     Ill-defined condition     chronic back problems    Morbid obesity ([address]     Thromboembolus ([address] 2019    dvt x2 right leg       Past Surgical History:   Procedure Laterality Date    COLONOSCOPY N/A 11/4/2019    COLONOSCOPY/ BMI 61 performed by Lashay Leary MD at [facility] HX CERVICAL FUSION  2019    HX [address]    HX CHOLECYSTECTOMY      HX GI      hernia repair    HX HEART CATHETERIZATION      no stent    HX HERNIA REPAIR      HX ORTHOPAEDIC Right 2012    trigger-finger release         Family History:   Problem Relation Age of Onset    Hypertension Mother     Diabetes Mother     Heart Attack Father     Cancer Brother         colon    Other Sister         brain aneurysm       Social History     Socioeconomic History    Marital status:      Spouse name: Not on file    Number of children: Not on file    Years of education: Not on file    Highest education level: Not on file   Occupational History    Not on file   Tobacco Use    Smoking status: Never Smoker    Smokeless tobacco: Never Used   Substance and Sexual Activity    Alcohol use: Never    Drug use: Never    Sexual activity: Not on file   Other Topics Concern    Not on file   Social History Narrative    Not on file     Social Determinants of Health     Financial Resource Strain:     Difficulty of Paying Living Expenses: Not on file   Food Insecurity:     Worried About Running Out of Food in the Last Year: Not on file    Sumeet of Food in the Last Year: Not on file   Transportation Needs:     Lack of Transportation (Medical): Not on file    Lack of Transportation (Non-Medical): Not on file   Physical Activity:     Days of Exercise per Week: Not on file    Minutes of Exercise per Session: Not on file   Stress:     Feeling of Stress : Not on file   Social Connections:     Frequency of Communication with Friends and Family: Not on file    Frequency of Social Gatherings with Friends and Family: Not on file    Attends Anabaptism Services: Not on file    Active Member of [address] [place] [address] or Organizations: Not on file    Attends Club or Organization Meetings: Not on file    Marital Status: Not on file   Intimate Partner Violence:     Fear of Current or Ex-Partner: Not on file    Emotionally Abused: Not on file    Physically Abused: Not on file    Sexually Abused: Not on file   Housing Stability:     Unable to Pay for Housing in the Last Year: Not on file    Number of [place] in the Last Year: Not on file    Unstable Housing in the Last Year: Not on file         ALLERGIES: Gabapentin, Lyrica [pregabalin], Niacin, Nystatin, and Percocet [oxycodone-acetaminophen]    Review of Systems   Respiratory: Positive for chest tightness.     All other systems reviewed and are negative. Vitals:    04/11/22 2210   BP: (!) 158/105   Pulse: 86   Resp: 18   Temp: 98.3 °F (36.8 °C)   SpO2: 93%   Weight: (!) 182.3 kg (402 lb)   Height: 5' 9\" (1.753 m)            Physical Exam     GENERAL:The patient has Body mass index is 59.37 kg/m². Well-hydrated. VITAL SIGNS: Heart rate, blood pressure, respiratory rate reviewed as recorded in  nurse's notes  EYES: Pupils reactive. Extraocular motion intact. No conjunctival redness or drainage. NECK: Supple, no meningeal signs. Trachea midline. No masses or thyromegaly. LUNGS: Breath sounds clear and equal bilaterally no accessory muscle use. CHEST: No deformity  CARDIOVASCULAR: Regular rate and rhythm  ABDOMEN: Soft without tenderness. No palpable masses or organomegaly. No  peritoneal signs. No rigidity. EXTREMITIES: No clubbing or cyanosis. No joint swelling. Normal muscle tone. No  restricted range of motion appreciated. NEUROLOGIC: Sensation is grossly intact. Cranial nerve exam reveals face is  symmetrical, tongue is midline speech is clear. Negative pronator drift in the arms. Patient's  things out of 5 equal bilaterally. She has good sensation in V1 through 3. SKIN: No rash or petechiae. Good skin turgor palpated. PSYCHIATRIC: Alert and oriented. Appropriate behavior and judgment.       MDM  Number of Diagnoses or Management Options  Diagnosis management comments: CHF, COPD, pneumonia, PE,    MI, coronary artery disease, unstable angina, coronary artery disease,    Atrial fibrillation, cardiac arrhythmia, PVC, medication induced palpitations, heart block,  electrolyte induced palpitations,    Electrolyte abnormalities,       Amount and/or Complexity of Data Reviewed  Clinical lab tests: reviewed and ordered  Decide to obtain previous medical records or to obtain history from someone other than the patient: yes  Review and summarize past medical records: yes      ED Course as of 04/12/22 0248   Tue Apr 12, 2022   [id number] I talked to the patient. Her repeat troponin came back negative. She is ready to go home however she has not been able to provide a urine sample for us to this point.  [KH]      ED Course User Index  [KH] Samreen Coto DO       Procedures

## 2022-04-21 ENCOUNTER — TELEPHONE (OUTPATIENT)
Dept: DIABETES SERVICES | Age: 57
End: 2022-04-21

## 2022-05-06 ENCOUNTER — HOSPITAL ENCOUNTER (OUTPATIENT)
Dept: LAB | Age: 57
Discharge: HOME | End: 2022-05-06
Payer: MEDICARE

## 2022-05-06 DIAGNOSIS — I10 BENIGN ESSENTIAL HYPERTENSION: ICD-10-CM

## 2022-05-06 DIAGNOSIS — E27.8 ADRENAL MASS (HCC): ICD-10-CM

## 2022-05-06 DIAGNOSIS — E55.9 VITAMIN D DEFICIENCY: ICD-10-CM

## 2022-05-06 DIAGNOSIS — E11.21 TYPE 2 DIABETES WITH NEPHROPATHY (HCC): ICD-10-CM

## 2022-05-06 DIAGNOSIS — R80.9 MICROALBUMINURIA: ICD-10-CM

## 2022-05-06 LAB
25(OH)D3 SERPL-MCNC: 24.3 NG/ML (ref 30–100)
ALBUMIN SERPL-MCNC: 2.8 G/DL (ref 3.5–5)
ALBUMIN/GLOB SERPL: 0.7 {RATIO} (ref 1.2–3.5)
ALP SERPL-CCNC: 155 U/L (ref 50–136)
ALT SERPL-CCNC: 20 U/L (ref 12–65)
ANION GAP SERPL CALC-SCNC: 7 MMOL/L (ref 7–16)
AST SERPL-CCNC: 11 U/L (ref 15–37)
BILIRUB SERPL-MCNC: 0.9 MG/DL (ref 0.2–1.1)
BUN SERPL-MCNC: 35 MG/DL (ref 6–23)
CALCIUM SERPL-MCNC: 8.8 MG/DL (ref 8.3–10.4)
CHLORIDE SERPL-SCNC: 103 MMOL/L (ref 98–107)
CHOLEST SERPL-MCNC: 182 MG/DL
CO2 SERPL-SCNC: 28 MMOL/L (ref 21–32)
CREAT SERPL-MCNC: 1.2 MG/DL (ref 0.6–1)
CREAT UR-MCNC: 79 MG/DL
EST. AVERAGE GLUCOSE BLD GHB EST-MCNC: 154 MG/DL
GLOBULIN SER CALC-MCNC: 4.1 G/DL (ref 2.3–3.5)
GLUCOSE SERPL-MCNC: 234 MG/DL (ref 65–100)
HBA1C MFR BLD: 7 % (ref 4.2–6.3)
HDLC SERPL-MCNC: 51 MG/DL (ref 40–60)
HDLC SERPL: 3.6 {RATIO}
LDLC SERPL CALC-MCNC: 109.2 MG/DL
POTASSIUM SERPL-SCNC: 4.3 MMOL/L (ref 3.5–5.1)
PROT SERPL-MCNC: 6.9 G/DL (ref 6.3–8.2)
PROT UR-MCNC: 10 MG/DL
PROT/CREAT UR-RTO: 0.1
SODIUM SERPL-SCNC: 138 MMOL/L (ref 136–145)
TRIGL SERPL-MCNC: 109 MG/DL (ref 35–150)
TSH W FREE THYROID I,TSHELE: 1.31 UIU/ML (ref 0.36–3.74)
VLDLC SERPL CALC-MCNC: 21.8 MG/DL (ref 6–23)

## 2022-05-06 PROCEDURE — 82306 VITAMIN D 25 HYDROXY: CPT

## 2022-05-06 PROCEDURE — 82088 ASSAY OF ALDOSTERONE: CPT

## 2022-05-06 PROCEDURE — 80053 COMPREHEN METABOLIC PANEL: CPT

## 2022-05-06 PROCEDURE — 36415 COLL VENOUS BLD VENIPUNCTURE: CPT

## 2022-05-06 PROCEDURE — 84156 ASSAY OF PROTEIN URINE: CPT

## 2022-05-06 PROCEDURE — 80061 LIPID PANEL: CPT

## 2022-05-06 PROCEDURE — 83835 ASSAY OF METANEPHRINES: CPT

## 2022-05-06 PROCEDURE — 84443 ASSAY THYROID STIM HORMONE: CPT

## 2022-05-06 PROCEDURE — 83036 HEMOGLOBIN GLYCOSYLATED A1C: CPT

## 2022-05-09 ENCOUNTER — HOSPITAL ENCOUNTER (OUTPATIENT)
Dept: LAB | Age: 57
Discharge: HOME | End: 2022-05-09
Payer: MEDICARE

## 2022-05-09 LAB — CORTIS BS SERPL-MCNC: 21.2 UG/DL

## 2022-05-09 PROCEDURE — 82533 TOTAL CORTISOL: CPT

## 2022-05-09 PROCEDURE — 82024 ASSAY OF ACTH: CPT

## 2022-05-09 PROCEDURE — 36415 COLL VENOUS BLD VENIPUNCTURE: CPT

## 2022-05-13 LAB — ACTH PLAS-MCNC: 28.5 PG/ML (ref 7.2–63.3)

## 2022-05-16 LAB
METANEPH FREE SERPL-MCNC: 13.5 PG/ML (ref 0–88)
NORMETANEPHRINE SERPL-MCNC: 67.2 PG/ML (ref 0–244)

## 2022-05-17 LAB
ALDOST SERPL-MCNC: 11.9 NG/DL (ref 0–30)
ALDOST/RENIN PLAS-RTO: 3.8 {RATIO} (ref 0–30)
RENIN PLAS-CCNC: 3.12 NG/ML/HR (ref 0.17–5.38)

## 2022-05-25 ENCOUNTER — TELEPHONE (OUTPATIENT)
Dept: ENDOCRINOLOGY | Age: 57
End: 2022-05-25

## 2022-05-25 NOTE — TELEPHONE ENCOUNTER
Gave patient her lab results. Patient verbalized understanding about her results and starting her vitamin D twice a week. Patient stated that she is in [facility] and they state she needs to be with [facility]. Patient states that right now she doesn't need a referral put in for endo but she will need a referral for nephrology to them if they have a provider in their network.  Patient advised to let us know if she has any issues getting set up with endo in the [facility] system

## 2022-05-27 ENCOUNTER — TELEPHONE (OUTPATIENT)
Dept: DIABETES SERVICES | Age: 57
End: 2022-05-27

## 2022-05-27 NOTE — PROGRESS NOTES
Patient is going to the [facility] and reports that all her medical treatment has to be [address]. Will cancel her Nutrition one hour appointment with Freya Gao on 6-8-2022.

## 2022-06-02 DIAGNOSIS — E11.21 TYPE 2 DIABETES WITH NEPHROPATHY (HCC): Primary | ICD-10-CM

## 2022-06-28 ENCOUNTER — TELEPHONE (OUTPATIENT)
Dept: INTERNAL MEDICINE CLINIC | Facility: CLINIC | Age: 57
End: 2022-06-28

## 2022-12-13 LAB
LEFT VENTRICULAR EJECTION FRACTION HIGH VALUE: 65 %
LEFT VENTRICULAR EJECTION FRACTION MODE: NORMAL
LV EF: 55 %

## (undated) DEVICE — SNARE POLYP SM W13MMXL240CM SHTH DIA2.4MM OVL FLX DISP

## (undated) DEVICE — CANNULA NSL ORAL AD FOR CAPNOFLEX CO2 O2 AIRLFE

## (undated) DEVICE — CONTAINER PREFIL FRMLN 40ML --

## (undated) DEVICE — NDL PRT INJ NSAF BLNT 18GX1.5 --

## (undated) DEVICE — FORCEPS BX L240CM JAW DIA2.8MM L CAP W/ NDL MIC MESH TOOTH

## (undated) DEVICE — SYR 3ML LL TIP 1/10ML GRAD --

## (undated) DEVICE — SYR 5ML 1/5 GRAD LL NSAF LF --

## (undated) DEVICE — KENDALL RADIOLUCENT FOAM MONITORING ELECTRODE RECTANGULAR SHAPE: Brand: KENDALL

## (undated) DEVICE — BLOCK BITE AD 60FR W/ VELC STRP ADDRESSES MOST PT AND

## (undated) DEVICE — CONNECTOR TBNG OD5-7MM O2 END DISP